# Patient Record
Sex: FEMALE | Race: WHITE | Employment: PART TIME | URBAN - NONMETROPOLITAN AREA
[De-identification: names, ages, dates, MRNs, and addresses within clinical notes are randomized per-mention and may not be internally consistent; named-entity substitution may affect disease eponyms.]

---

## 2017-01-17 ENCOUNTER — TELEPHONE (OUTPATIENT)
Dept: UROLOGY | Age: 37
End: 2017-01-17

## 2017-01-17 RX ORDER — FLUCONAZOLE 150 MG/1
150 TABLET ORAL ONCE
Qty: 1 TABLET | Refills: 0 | Status: SHIPPED | OUTPATIENT
Start: 2017-01-17 | End: 2017-01-17

## 2017-01-24 ENCOUNTER — TELEPHONE (OUTPATIENT)
Dept: UROLOGY | Age: 37
End: 2017-01-24

## 2017-02-23 ENCOUNTER — OFFICE VISIT (OUTPATIENT)
Dept: FAMILY MEDICINE CLINIC | Age: 37
End: 2017-02-23

## 2017-02-23 ENCOUNTER — TELEPHONE (OUTPATIENT)
Dept: FAMILY MEDICINE CLINIC | Age: 37
End: 2017-02-23

## 2017-02-23 VITALS
WEIGHT: 201.6 LBS | SYSTOLIC BLOOD PRESSURE: 132 MMHG | HEART RATE: 88 BPM | BODY MASS INDEX: 30.55 KG/M2 | TEMPERATURE: 98.2 F | RESPIRATION RATE: 18 BRPM | HEIGHT: 68 IN | DIASTOLIC BLOOD PRESSURE: 77 MMHG

## 2017-02-23 DIAGNOSIS — R07.81 RIB PAIN ON LEFT SIDE: Primary | ICD-10-CM

## 2017-02-23 DIAGNOSIS — F17.210 CIGARETTE NICOTINE DEPENDENCE WITHOUT COMPLICATION: Chronic | ICD-10-CM

## 2017-02-23 DIAGNOSIS — I10 ESSENTIAL HYPERTENSION: Chronic | ICD-10-CM

## 2017-02-23 DIAGNOSIS — R22.2 CHEST WALL MASS: ICD-10-CM

## 2017-02-23 PROCEDURE — 99214 OFFICE O/P EST MOD 30 MIN: CPT | Performed by: FAMILY MEDICINE

## 2017-02-23 RX ORDER — VARENICLINE TARTRATE 25 MG
KIT ORAL
Qty: 1 EACH | Refills: 0 | Status: SHIPPED | OUTPATIENT
Start: 2017-02-23 | End: 2017-05-09

## 2017-02-27 ENCOUNTER — TELEPHONE (OUTPATIENT)
Dept: FAMILY MEDICINE CLINIC | Age: 37
End: 2017-02-27

## 2017-03-03 ENCOUNTER — TELEPHONE (OUTPATIENT)
Dept: FAMILY MEDICINE CLINIC | Age: 37
End: 2017-03-03

## 2017-03-07 DIAGNOSIS — I10 ESSENTIAL HYPERTENSION: Chronic | ICD-10-CM

## 2017-03-07 RX ORDER — AMLODIPINE BESYLATE 10 MG/1
10 TABLET ORAL DAILY
Qty: 90 TABLET | Refills: 3 | Status: SHIPPED | OUTPATIENT
Start: 2017-03-07 | End: 2020-01-17 | Stop reason: SDUPTHER

## 2017-03-08 ENCOUNTER — TELEPHONE (OUTPATIENT)
Dept: UROLOGY | Age: 37
End: 2017-03-08

## 2017-03-23 ENCOUNTER — OFFICE VISIT (OUTPATIENT)
Dept: FAMILY MEDICINE CLINIC | Age: 37
End: 2017-03-23

## 2017-03-23 VITALS
HEART RATE: 76 BPM | BODY MASS INDEX: 31.01 KG/M2 | RESPIRATION RATE: 16 BRPM | WEIGHT: 204.6 LBS | TEMPERATURE: 98.2 F | HEIGHT: 68 IN | SYSTOLIC BLOOD PRESSURE: 118 MMHG | DIASTOLIC BLOOD PRESSURE: 76 MMHG

## 2017-03-23 DIAGNOSIS — F17.211 CIGARETTE NICOTINE DEPENDENCE IN REMISSION: Primary | Chronic | ICD-10-CM

## 2017-03-23 PROBLEM — R07.81 RIB PAIN ON LEFT SIDE: Status: RESOLVED | Noted: 2017-02-23 | Resolved: 2017-03-23

## 2017-03-23 PROBLEM — R22.2 CHEST WALL MASS: Status: RESOLVED | Noted: 2017-02-23 | Resolved: 2017-03-23

## 2017-03-23 PROCEDURE — 99213 OFFICE O/P EST LOW 20 MIN: CPT | Performed by: FAMILY MEDICINE

## 2017-03-23 RX ORDER — VARENICLINE TARTRATE 1 MG/1
1 TABLET, FILM COATED ORAL 2 TIMES DAILY
Qty: 60 TABLET | Refills: 1 | Status: SHIPPED | OUTPATIENT
Start: 2017-03-23 | End: 2017-05-09

## 2017-05-09 ENCOUNTER — OFFICE VISIT (OUTPATIENT)
Dept: UROLOGY | Age: 37
End: 2017-05-09

## 2017-05-09 VITALS
DIASTOLIC BLOOD PRESSURE: 90 MMHG | HEIGHT: 67 IN | SYSTOLIC BLOOD PRESSURE: 130 MMHG | WEIGHT: 190 LBS | BODY MASS INDEX: 29.82 KG/M2

## 2017-05-09 DIAGNOSIS — N39.0 RECURRENT UTI: Primary | Chronic | ICD-10-CM

## 2017-05-09 LAB
BILIRUBIN URINE: NEGATIVE
BLOOD URINE, POC: NORMAL
CHARACTER, URINE: CLEAR
COLOR, URINE: YELLOW
GLUCOSE URINE: NEGATIVE MG/DL
KETONES, URINE: NEGATIVE
LEUKOCYTE CLUMPS, URINE: NORMAL
NITRITE, URINE: POSITIVE
PH, URINE: 6.5
POST VOID RESIDUAL (PVR): 89 ML
PROTEIN, URINE: NEGATIVE MG/DL
SPECIFIC GRAVITY, URINE: 1.02 (ref 1–1.03)
UROBILINOGEN, URINE: 0.2 EU/DL

## 2017-05-09 PROCEDURE — 51798 US URINE CAPACITY MEASURE: CPT | Performed by: NURSE PRACTITIONER

## 2017-05-09 PROCEDURE — 81003 URINALYSIS AUTO W/O SCOPE: CPT | Performed by: NURSE PRACTITIONER

## 2017-05-09 PROCEDURE — 99213 OFFICE O/P EST LOW 20 MIN: CPT | Performed by: NURSE PRACTITIONER

## 2017-05-09 RX ORDER — METHENAMINE HIPPURATE 1000 MG/1
1 TABLET ORAL 2 TIMES DAILY WITH MEALS
Qty: 60 TABLET | Refills: 11 | Status: SHIPPED | OUTPATIENT
Start: 2017-05-09 | End: 2018-05-09

## 2017-05-11 ENCOUNTER — TELEPHONE (OUTPATIENT)
Dept: UROLOGY | Age: 37
End: 2017-05-11

## 2017-05-11 LAB
ORGANISM: ABNORMAL
URINE CULTURE, ROUTINE: ABNORMAL

## 2017-06-13 ENCOUNTER — OFFICE VISIT (OUTPATIENT)
Dept: UROLOGY | Age: 37
End: 2017-06-13

## 2017-06-13 VITALS
DIASTOLIC BLOOD PRESSURE: 80 MMHG | HEIGHT: 68 IN | WEIGHT: 205 LBS | SYSTOLIC BLOOD PRESSURE: 118 MMHG | BODY MASS INDEX: 31.07 KG/M2

## 2017-06-13 DIAGNOSIS — N39.0 RECURRENT UTI: Primary | ICD-10-CM

## 2017-06-13 LAB
BILIRUBIN URINE: NEGATIVE
BLOOD URINE, POC: NORMAL
CHARACTER, URINE: CLEAR
COLOR, URINE: YELLOW
GLUCOSE URINE: NEGATIVE MG/DL
KETONES, URINE: NEGATIVE
LEUKOCYTE CLUMPS, URINE: NEGATIVE
NITRITE, URINE: NEGATIVE
PH, URINE: 7
POST VOID RESIDUAL (PVR): 39 ML
PROTEIN, URINE: NEGATIVE MG/DL
SPECIFIC GRAVITY, URINE: 1.02 (ref 1–1.03)
UROBILINOGEN, URINE: 0.2 EU/DL

## 2017-06-13 PROCEDURE — 99999 PR OFFICE/OUTPT VISIT,PROCEDURE ONLY: CPT | Performed by: NURSE PRACTITIONER

## 2017-06-13 PROCEDURE — 99999 POST VOID RESIDUAL (PVR): CPT | Performed by: NURSE PRACTITIONER

## 2017-06-13 PROCEDURE — 81003 URINALYSIS AUTO W/O SCOPE: CPT | Performed by: NURSE PRACTITIONER

## 2018-02-07 ENCOUNTER — HOSPITAL ENCOUNTER (EMERGENCY)
Age: 38
Discharge: HOME | End: 2018-02-07
Payer: COMMERCIAL

## 2018-02-07 VITALS
HEART RATE: 91 BPM | BODY MASS INDEX: 29.58 KG/M2 | DIASTOLIC BLOOD PRESSURE: 111 MMHG | WEIGHT: 195.2 LBS | RESPIRATION RATE: 16 BRPM | SYSTOLIC BLOOD PRESSURE: 159 MMHG | TEMPERATURE: 98.3 F | HEIGHT: 68 IN | OXYGEN SATURATION: 98 %

## 2018-02-07 DIAGNOSIS — J10.1 INFLUENZA A: Primary | ICD-10-CM

## 2018-02-07 LAB
FLU A ANTIGEN: POSITIVE
INFLUENZA B AG, EIA: NEGATIVE

## 2018-02-07 PROCEDURE — 99214 OFFICE O/P EST MOD 30 MIN: CPT

## 2018-02-07 PROCEDURE — 99214 OFFICE O/P EST MOD 30 MIN: CPT | Performed by: NURSE PRACTITIONER

## 2018-02-07 PROCEDURE — 87804 INFLUENZA ASSAY W/OPTIC: CPT

## 2018-02-07 RX ORDER — ACETAMINOPHEN, ASPIRIN AND CAFFEINE 250; 250; 65 MG/1; MG/1; MG/1
1 TABLET, FILM COATED ORAL EVERY 6 HOURS PRN
COMMUNITY
End: 2018-03-31

## 2018-02-07 RX ORDER — OSELTAMIVIR PHOSPHATE 75 MG/1
75 CAPSULE ORAL 2 TIMES DAILY
Qty: 10 CAPSULE | Refills: 0 | Status: SHIPPED | OUTPATIENT
Start: 2018-02-07 | End: 2018-02-12

## 2018-02-07 ASSESSMENT — PAIN DESCRIPTION - DESCRIPTORS: DESCRIPTORS: ACHING

## 2018-02-07 ASSESSMENT — PAIN DESCRIPTION - LOCATION: LOCATION: GENERALIZED

## 2018-02-07 ASSESSMENT — PAIN SCALES - GENERAL: PAINLEVEL_OUTOF10: 5

## 2018-02-07 ASSESSMENT — PAIN DESCRIPTION - PAIN TYPE: TYPE: ACUTE PAIN

## 2018-02-08 ASSESSMENT — ENCOUNTER SYMPTOMS
SORE THROAT: 0
COUGH: 1
VOMITING: 0
VOICE CHANGE: 0
RHINORRHEA: 1
DIARRHEA: 1
ABDOMINAL PAIN: 0
SINUS PRESSURE: 0
STRIDOR: 0
WHEEZING: 0
TROUBLE SWALLOWING: 0
NAUSEA: 0

## 2018-02-09 NOTE — ED PROVIDER NOTES
regular rhythm, S1 normal, S2 normal and normal heart sounds. No murmur heard. Pulmonary/Chest: Effort normal and breath sounds normal. No accessory muscle usage or stridor. No respiratory distress. She has no decreased breath sounds. She has no wheezes. She has no rhonchi. She has no rales. Abdominal: Soft. Normal appearance and bowel sounds are normal. She exhibits no distension. There is no tenderness. There is no rigidity, no rebound and no guarding. Lymphadenopathy:     She has no cervical adenopathy. Neurological: She is alert and oriented to person, place, and time. Skin: Skin is warm, dry and intact. No rash noted. She is not diaphoretic. No cyanosis or erythema. No pallor. Nursing note and vitals reviewed. DIAGNOSTIC RESULTS   Labs:  Results for orders placed or performed during the hospital encounter of 02/07/18   Rapid influenza A/B antigens   Result Value Ref Range    Flu A Antigen POSITIVE (A) NEGATIVE    Influenza B Ag, EIA NEGATIVE NEGATIVE       IMAGING:    URGENT CARE COURSE:     Vitals:    02/07/18 1118   BP: (!) 159/111   Pulse: 91   Resp: 16   Temp: 98.3 °F (36.8 °C)   TempSrc: Oral   SpO2: 98%   Weight: 195 lb 3.2 oz (88.5 kg)   Height: 5' 8\" (1.727 m)       Medications - No data to display  PROCEDURES:  None  FINAL IMPRESSION      1. Influenza A        DISPOSITION/PLAN   DISPOSITION Decision To Discharge 02/07/2018 12:05:12 PM  Influenza A positive   I have recommend the following symptomatic treatment for influenza: push fluids, use a vaporizer, use Tylenol or OTC NSAID's (Advil, Alleve etc) for fever or achiness, OTC cough suppressant/decongestants such as Robitussin DM and rest. The symptoms usually resolve in 4-6 days. Call for appointment if symptoms persist or if develops new symptoms such as wheezing or shortness of breath.    PATIENT REFERRED TO:  Jeane [address]ard [address]  [address] [facility]  [phone]    Schedule an appointment as soon as possible for a

## 2018-03-31 ENCOUNTER — HOSPITAL ENCOUNTER (EMERGENCY)
Age: 38
Discharge: HOME | End: 2018-03-31
Payer: COMMERCIAL

## 2018-03-31 VITALS
BODY MASS INDEX: 30.92 KG/M2 | TEMPERATURE: 98.4 F | OXYGEN SATURATION: 99 % | WEIGHT: 197 LBS | HEIGHT: 67 IN | DIASTOLIC BLOOD PRESSURE: 107 MMHG | RESPIRATION RATE: 18 BRPM | SYSTOLIC BLOOD PRESSURE: 168 MMHG | HEART RATE: 74 BPM

## 2018-03-31 DIAGNOSIS — H10.022 OTHER MUCOPURULENT CONJUNCTIVITIS OF LEFT EYE: Primary | ICD-10-CM

## 2018-03-31 PROCEDURE — 99213 OFFICE O/P EST LOW 20 MIN: CPT

## 2018-03-31 PROCEDURE — 99213 OFFICE O/P EST LOW 20 MIN: CPT | Performed by: NURSE PRACTITIONER

## 2018-03-31 RX ORDER — TOBRAMYCIN AND DEXAMETHASONE 3; 1 MG/ML; MG/ML
1 SUSPENSION/ DROPS OPHTHALMIC
Qty: 1 BOTTLE | Refills: 0 | Status: SHIPPED | OUTPATIENT
Start: 2018-03-31 | End: 2018-04-10

## 2018-03-31 ASSESSMENT — ENCOUNTER SYMPTOMS
WHEEZING: 0
CRUSTING: 1
EYE WATERING: 1
EYE REDNESS: 1
PHOTOPHOBIA: 1
EYE DISCHARGE: 1
EYE INFLAMMATION: 1
SINUS PAIN: 0
CHEST TIGHTNESS: 0
ALLERGIC/IMMUNOLOGIC NEGATIVE: 1
PERI-ORBITAL EDEMA: 1
ABDOMINAL DISTENTION: 0
ABDOMINAL PAIN: 0
COUGH: 0
EYE ITCHING: 1

## 2018-03-31 ASSESSMENT — PAIN DESCRIPTION - LOCATION: LOCATION: EYE

## 2018-03-31 ASSESSMENT — VISUAL ACUITY
OS: 20/25
OD: 20/20
OU: 20/20

## 2018-03-31 ASSESSMENT — PAIN SCALES - GENERAL: PAINLEVEL_OUTOF10: 9

## 2018-03-31 ASSESSMENT — PAIN DESCRIPTION - ORIENTATION: ORIENTATION: LEFT

## 2018-03-31 ASSESSMENT — PAIN DESCRIPTION - DESCRIPTORS: DESCRIPTORS: DISCOMFORT

## 2018-03-31 ASSESSMENT — PAIN DESCRIPTION - PAIN TYPE: TYPE: ACUTE PAIN

## 2020-01-17 ENCOUNTER — HOSPITAL ENCOUNTER (EMERGENCY)
Age: 40
Discharge: HOME | End: 2020-01-17
Payer: COMMERCIAL

## 2020-01-17 VITALS
OXYGEN SATURATION: 100 % | WEIGHT: 183 LBS | HEART RATE: 68 BPM | SYSTOLIC BLOOD PRESSURE: 184 MMHG | BODY MASS INDEX: 28.72 KG/M2 | TEMPERATURE: 97.8 F | RESPIRATION RATE: 16 BRPM | DIASTOLIC BLOOD PRESSURE: 102 MMHG | HEIGHT: 67 IN

## 2020-01-17 PROCEDURE — 99213 OFFICE O/P EST LOW 20 MIN: CPT | Performed by: NURSE PRACTITIONER

## 2020-01-17 PROCEDURE — 99213 OFFICE O/P EST LOW 20 MIN: CPT

## 2020-01-17 RX ORDER — BLOOD PRESSURE TEST KIT
1 KIT MISCELLANEOUS DAILY
Qty: 1 KIT | Refills: 0 | Status: SHIPPED | OUTPATIENT
Start: 2020-01-17 | End: 2022-09-22

## 2020-01-17 RX ORDER — AMLODIPINE BESYLATE 10 MG/1
10 TABLET ORAL DAILY
Qty: 7 TABLET | Refills: 0 | Status: SHIPPED | OUTPATIENT
Start: 2020-01-17 | End: 2020-04-09 | Stop reason: SDUPTHER

## 2020-01-17 ASSESSMENT — ENCOUNTER SYMPTOMS
EYE PAIN: 0
SHORTNESS OF BREATH: 0
VOMITING: 1
BACK PAIN: 0
CHEST TIGHTNESS: 0
NAUSEA: 0
ALLERGIC/IMMUNOLOGIC NEGATIVE: 1
ABDOMINAL PAIN: 0
COUGH: 0

## 2020-01-17 NOTE — LETTER
[facility]  [address]  [address]  Phone: [phone]               January 17, 2020    Patient: Tesfaye Mahmood   YOB: 1980   Date of Visit: 1/17/2020       To Whom It May Concern:    Natalie Jiang was seen and treated in our emergency department on 1/17/2020. She may return to work on 1-.       Sincerely,       PETER Marina CNP         Signature:____Electronically signed by PETER Marina CNP on 1/17/2020 at 6:14 PM______________________________

## 2020-01-17 NOTE — ED PROVIDER NOTES
disease)     History of blood transfusion 1980    Insomnia 2014    Kidney stones     Migraine headache     Nephrolithiasis     Nicotine dependence     Overweight     Recurrent UTI        SURGICAL HISTORY     Patient  has a past surgical history that includes  section (,,); Lithotripsy (off and on since ); Tubal ligation (); other surgical history (10/21/2016); and Kidney removal (Left). CURRENT MEDICATIONS       Previous Medications    ACETAMINOPHEN (TYLENOL) 325 MG TABLET    Take 650 mg by mouth every 6 hours as needed for Pain. ALLERGIES     Patient is has No Known Allergies. FAMILY HISTORY     Patient'sfamily history includes Diabetes in her maternal grandmother; High Blood Pressure in her mother. SOCIAL HISTORY     Patient  reports that she has been smoking cigarettes. She has a 5.00 pack-year smoking history. She has never used smokeless tobacco. She reports that she does not drink alcohol or use drugs. PHYSICAL EXAM     ED TRIAGE VITALS  BP: (!) 199/102, Temp: 97.8 °F (36.6 °C), Pulse: 68, Resp: 16, SpO2: 100 %  Physical Exam  Vitals signs and nursing note reviewed. Constitutional:       General: She is not in acute distress. Appearance: Normal appearance. She is well-developed, well-groomed and overweight. She is not ill-appearing, toxic-appearing or diaphoretic. HENT:      Head: Normocephalic and atraumatic. Right Ear: Hearing and external ear normal.      Left Ear: Hearing and external ear normal.      Nose: Nose normal.      Mouth/Throat:      Lips: Pink. Mouth: Mucous membranes are moist.   Eyes:      General: Lids are normal.      Extraocular Movements: Extraocular movements intact. Conjunctiva/sclera: Conjunctivae normal.      Right eye: Right conjunctiva is not injected. Left eye: Left conjunctiva is not injected. Pupils: Pupils are equal, round, and reactive to light.  Pupils are equal.      Right eye: Pupil is round, reactive and not sluggish. Left eye: Pupil is round, reactive and not sluggish. Neck:      Musculoskeletal: Normal range of motion. Cardiovascular:      Rate and Rhythm: Normal rate and regular rhythm. Chest Wall: PMI is not displaced. No thrill. Pulses: Normal pulses. Heart sounds: Normal heart sounds. Heart sounds not distant. No murmur. Pulmonary:      Effort: Pulmonary effort is normal. No respiratory distress. Breath sounds: Normal breath sounds and air entry. No stridor, decreased air movement or transmitted upper airway sounds. No decreased breath sounds, wheezing, rhonchi or rales. Abdominal:      General: Abdomen is flat. There is no distension. Tenderness: There is no tenderness. Musculoskeletal:      Right knee: She exhibits normal range of motion. Left knee: She exhibits normal range of motion. Skin:     General: Skin is warm and dry. Coloration: Skin is not pale. Findings: No rash. Comments: No obvious signs of infection or trauma. Neurological:      General: No focal deficit present. Mental Status: She is alert and oriented to person, place, and time. Sensory: No sensory deficit. Psychiatric:         Behavior: Behavior normal. Behavior is cooperative. DIAGNOSTIC RESULTS   Labs:No results found for this visit on 01/17/20. IMAGING:  No orders to display     URGENT CARE COURSE:     Vitals:    01/17/20 1753   BP: (!) 199/102   Pulse: 68   Resp: 16   Temp: 97.8 °F (36.6 °C)   TempSrc: Temporal   SpO2: 100%   Weight: 183 lb (83 kg)   Height: 5' 7\" (1.702 m)       Medications - No data to display  PROCEDURES:  FINALIMPRESSION      1. Nicotine dependence, uncomplicated, unspecified nicotine product type    2. Essential hypertension    3. Atrophic kidney, acquired    4.  Patient overweight        DISPOSITION/PLAN   DISPOSITION Discharge - Pending Orders Complete 01/17/2020 06:08:13 PM  Record blood pressure daily and

## 2020-02-15 ENCOUNTER — HOSPITAL ENCOUNTER (EMERGENCY)
Age: 40
Discharge: HOME | End: 2020-02-15
Payer: COMMERCIAL

## 2020-02-15 VITALS
HEIGHT: 67 IN | TEMPERATURE: 98.8 F | WEIGHT: 180 LBS | HEART RATE: 72 BPM | RESPIRATION RATE: 16 BRPM | SYSTOLIC BLOOD PRESSURE: 147 MMHG | BODY MASS INDEX: 28.25 KG/M2 | OXYGEN SATURATION: 97 % | DIASTOLIC BLOOD PRESSURE: 72 MMHG

## 2020-02-15 PROCEDURE — 99213 OFFICE O/P EST LOW 20 MIN: CPT

## 2020-02-15 PROCEDURE — 99213 OFFICE O/P EST LOW 20 MIN: CPT | Performed by: NURSE PRACTITIONER

## 2020-02-15 RX ORDER — ONDANSETRON 4 MG/1
4 TABLET, FILM COATED ORAL EVERY 8 HOURS PRN
Qty: 15 TABLET | Refills: 0 | Status: SHIPPED | OUTPATIENT
Start: 2020-02-15 | End: 2020-02-20

## 2020-02-15 ASSESSMENT — ENCOUNTER SYMPTOMS
EYE REDNESS: 1
ALLERGIC/IMMUNOLOGIC NEGATIVE: 1
SINUS PRESSURE: 1
VOMITING: 1
NAUSEA: 1
RESPIRATORY NEGATIVE: 1

## 2020-02-15 NOTE — ED TRIAGE NOTES
Ambulates to room in stable condition with c/o vomiting x3 and eye swelling and watery eyes since this am at 0600

## 2020-02-15 NOTE — ED PROVIDER NOTES
[address] [facility] Encounter      CHIEFCOMPLAINT       Chief Complaint   Patient presents with    Emesis    Eye Drainage    Letter for School/Work       Nurses Notes reviewed and I agree except as noted in the HPI. HISTORY OF PRESENT ILLNESS   Amalia Dwyer is a 36 y.o. female who presents with vomiting and ear/eye congestion x 6 hours. vomited 3 x at work. Was sent home for note. No fever or chills. No diarrhea. Does have headache at this time. Has not tried to eat or drink anything since vomiting. REVIEW OF SYSTEMS     Review of Systems   Constitutional: Positive for activity change, appetite change and fatigue. Negative for fever. HENT: Positive for congestion, ear pain and sinus pressure. Eyes: Positive for redness. Respiratory: Negative. Cardiovascular: Negative for chest pain and leg swelling. Gastrointestinal: Positive for nausea and vomiting. Endocrine: Negative for cold intolerance and heat intolerance. Genitourinary: Negative. Musculoskeletal: Positive for myalgias. Negative for arthralgias. Skin: Negative. Allergic/Immunologic: Negative. Neurological: Negative for dizziness, weakness and headaches. Hematological: Negative for adenopathy. Does not bruise/bleed easily. Psychiatric/Behavioral: Negative. PAST MEDICAL HISTORY         Diagnosis Date    Atrophic kidney     Left    Essential hypertension 2016    GERD (gastroesophageal reflux disease)     History of blood transfusion     Insomnia 2014    Kidney stones     Migraine headache     Nephrolithiasis     Nicotine dependence     Overweight     Recurrent UTI        SURGICAL HISTORY     Patient  has a past surgical history that includes  section (,,); Lithotripsy (off and on since ); Tubal ligation (); other surgical history (10/21/2016); and Kidney removal (Left).     CURRENT MEDICATIONS       Previous Medications    ACETAMINOPHEN tenderness. Musculoskeletal: Normal range of motion. General: No tenderness. Lymphadenopathy:      Cervical: No cervical adenopathy. Skin:     General: Skin is warm and dry. Capillary Refill: Capillary refill takes less than 2 seconds. Findings: No erythema or rash. Neurological:      General: No focal deficit present. Mental Status: She is alert and oriented to person, place, and time. Psychiatric:         Behavior: Behavior normal.         Thought Content: Thought content normal.         Judgment: Judgment normal.         DIAGNOSTIC RESULTS   Labs: No results found for this visit on 02/15/20. IMAGING:  No orders to display     URGENT CARE COURSE:     Vitals:    02/15/20 1812   BP: (!) 147/72   Pulse: 72   Resp: 16   Temp: 98.8 °F (37.1 °C)   TempSrc: Oral   SpO2: 97%   Weight: 180 lb (81.6 kg)   Height: 5' 7\" (1.702 m)       Medications - No data to display  PROCEDURES:  None  FINALIMPRESSION    I have reviewed the patient's medical history in detail and updated the computerized patient record. Information given on rehydration and measures for comfort. Work note provided.    1. Non-intractable vomiting with nausea, unspecified vomiting type        DISPOSITION/PLAN   DISPOSITION      PATIENT REFERRED TO:  Jesenia Ovalle. [address]  [phone]    In 3 days  As needed, If symptoms worsen    DISCHARGE MEDICATIONS:  New Prescriptions    ONDANSETRON (ZOFRAN) 4 MG TABLET    Take 1 tablet by mouth every 8 hours as needed for Nausea or Vomiting     Current Discharge Medication List          PETER Woodard CNP, APRN - CNP  02/15/20 4519

## 2020-02-26 ENCOUNTER — TELEPHONE (OUTPATIENT)
Dept: FAMILY MEDICINE CLINIC | Age: 40
End: 2020-02-26

## 2020-02-26 NOTE — TELEPHONE ENCOUNTER
appt reminder  Future Appointments   Date Time Provider Rex Dior   2/27/2020  8:20 AM Ryan Jasso, [facility]

## 2020-04-09 RX ORDER — AMLODIPINE BESYLATE 10 MG/1
10 TABLET ORAL DAILY
Qty: 90 TABLET | Refills: 3 | Status: SHIPPED | OUTPATIENT
Start: 2020-04-09 | End: 2022-04-11

## 2020-10-19 ENCOUNTER — OFFICE VISIT (OUTPATIENT)
Dept: FAMILY MEDICINE CLINIC | Age: 40
End: 2020-10-19
Payer: COMMERCIAL

## 2020-10-19 VITALS
SYSTOLIC BLOOD PRESSURE: 122 MMHG | OXYGEN SATURATION: 99 % | RESPIRATION RATE: 14 BRPM | DIASTOLIC BLOOD PRESSURE: 80 MMHG | WEIGHT: 159 LBS | BODY MASS INDEX: 24.96 KG/M2 | HEIGHT: 67 IN | HEART RATE: 87 BPM | TEMPERATURE: 98.4 F

## 2020-10-19 PROCEDURE — 99204 OFFICE O/P NEW MOD 45 MIN: CPT | Performed by: FAMILY MEDICINE

## 2020-10-19 RX ORDER — VARENICLINE TARTRATE 1 MG/1
1 TABLET, FILM COATED ORAL 2 TIMES DAILY
Qty: 60 TABLET | Refills: 3 | Status: SHIPPED | OUTPATIENT
Start: 2020-10-19 | End: 2021-06-17

## 2020-10-19 RX ORDER — VARENICLINE TARTRATE
KIT
Qty: 1 BOX | Refills: 0 | OUTPATIENT
Start: 2020-10-19 | End: 2021-06-17

## 2020-10-19 RX ORDER — PREDNISONE 20 MG/1
40 TABLET ORAL DAILY
Qty: 10 TABLET | Refills: 0 | Status: SHIPPED | OUTPATIENT
Start: 2020-10-19 | End: 2020-10-24

## 2020-10-19 ASSESSMENT — PATIENT HEALTH QUESTIONNAIRE - PHQ9
SUM OF ALL RESPONSES TO PHQ QUESTIONS 1-9: 0
1. LITTLE INTEREST OR PLEASURE IN DOING THINGS: 0
SUM OF ALL RESPONSES TO PHQ QUESTIONS 1-9: 0
SUM OF ALL RESPONSES TO PHQ QUESTIONS 1-9: 0
2. FEELING DOWN, DEPRESSED OR HOPELESS: 0
SUM OF ALL RESPONSES TO PHQ9 QUESTIONS 1 & 2: 0

## 2020-10-19 NOTE — PROGRESS NOTES
Chief Complaint   Patient presents with    Neck Pain     radiation down L arm    Hypertension    Nicotine Dependence       History obtained from the patient. SUBJECTIVE:  Jac Segura is a 36 y.o. female that presents today for establishing care with new physician, etc. New patient, 1st time visit to [facility]. Last seen 21 MARCH 2017, >3 years ago, by definition is a new pt.       -Neck and L shoulder Pain:    HPI:   Neck pain going on a few months on and off  Started having pain radiating down L arm since Sunday  No obvious cause  Does work at a [employer]  Woke up with the above sxs  Better with motrin and biofeeze  Worse moving neck or laying on L side. No prior neck injuries    She describes the pain as dull, aching, throbbing  in the cervical region. Inciting injury or history of trauma? No  Pain is relieved by - as above  Pain is aggravated by - as above  Radiation of the pain? Yes - L arm  Paresthesias of the extremities? No  Saddle anesthesia? No  Bowel or bladder incontinence? No  Treatments tried - motrin, biofreeze.          -HTN:    HPI:    Taking meds as prescribed ?: yes  Tolerating well ?: yes  Side Effects ?: denies  BP at home ?: <140/90  Working on TLCS ?: yes  Chest Pain/SOB/Palpitations? denies    BP Readings from Last 3 Encounters:   10/19/20 122/80   02/15/20 (!) 147/72   01/17/20 (!) 184/102         -Smoking:  Smoking 1/2 PPD  Wants to quit  Failed patches and gum        Age/Gender Health Maintenance    Lipid -   Lab Results   Component Value Date    CHOL 132 02/24/2017    CHOL 141 02/12/2016    CHOL 148 05/28/2013     Lab Results   Component Value Date    TRIG 97 02/24/2017    TRIG 114 02/12/2016    TRIG 132 05/28/2013     Lab Results   Component Value Date    HDL 33 02/24/2017    HDL 34 02/12/2016    HDL 37 05/28/2013     Lab Results   Component Value Date    LDLCALC 80 02/24/2017    LDLCALC 84 02/12/2016    LDLCALC 109 05/28/2013     Lab Results   Component Value Date VLDL 26 05/28/2013     No results found for: CHOLHDLRATIO      DM Screen -   Lab Results   Component Value Date    GLUCOSE 89 02/24/2017         Colon Cancer Screening - age 48    Tetanus - UTD 2010 and UTD TD DEC 2019  Influenza Vaccine - declines OCT 2020  Pneumonia Vaccine - discuss next visit if hasn't quit smoking. Zoster - age 48    Breast Cancer Screening - Age 36  Cervical Cancer Screening - NEG DEC 2012  Osteoporosis Screening - Age 72    Falls screening - N/A        Current Outpatient Medications   Medication Sig Dispense Refill    varenicline (CHANTIX CONTINUING MONTH RAFITA) 1 MG tablet Take 1 tablet by mouth 2 times daily 60 tablet 3    varenicline (CHANTIX STARTING MONTH RAFITA) 0.5 MG X 11 & 1 MG X 42 tablet Take by mouth. 1 box 0    predniSONE (DELTASONE) 20 MG tablet Take 2 tablets by mouth daily for 5 days 10 tablet 0    amLODIPine (NORVASC) 10 MG tablet Take 1 tablet by mouth daily 90 tablet 3    Blood Pressure KIT 1 kit by Does not apply route daily 1 kit 0    acetaminophen (TYLENOL) 325 MG tablet Take 650 mg by mouth every 6 hours as needed for Pain. No current facility-administered medications for this visit. Orders Placed This Encounter   Medications    varenicline (CHANTIX CONTINUING MONTH RAFITA) 1 MG tablet     Sig: Take 1 tablet by mouth 2 times daily     Dispense:  60 tablet     Refill:  3    varenicline (CHANTIX STARTING MONTH RAFITA) 0.5 MG X 11 & 1 MG X 42 tablet     Sig: Take by mouth. Dispense:  1 box     Refill:  0    predniSONE (DELTASONE) 20 MG tablet     Sig: Take 2 tablets by mouth daily for 5 days     Dispense:  10 tablet     Refill:  0         All medications reviewed and reconciled, including OTC and herbal medications. Updated list given to patient.        Patient Active Problem List    Diagnosis Date Noted    XGP (xanthogranulomatous pyelonephritis)     Hematuria     Obstruction of left ureteropelvic junction (UPJ) due to stone     Left ureteral stone 09/15/2016    Essential hypertension 2016    Seborrheic dermatitis     Biliary colic     Insomnia     Atrophic kidney, acquired      Left      Nicotine dependence     Migraine headache     Recurrent UTI     GERD (gastroesophageal reflux disease)     Nephrolithiasis          Past Medical History:   Diagnosis Date    Atrophic kidney, acquired     Left     Essential hypertension 2016    GERD (gastroesophageal reflux disease)     History of blood transfusion 1980    Insomnia 2014    Migraine headache     Nephrolithiasis     Nicotine dependence     Recurrent UTI          Past Surgical History:   Procedure Laterality Date     SECTION  ,,    KIDNEY REMOVAL Left     complete    LITHOTRIPSY  off and on since     OTHER SURGICAL HISTORY  10/21/2016    Robotic Left Nephroureterectomy - Dr. Jana Mei.  TUBAL LIGATION           No Known Allergies      Social History     Tobacco Use    Smoking status: Current Every Day Smoker     Packs/day: 0.50     Years: 10.00     Pack years: 5.00     Types: Cigarettes    Smokeless tobacco: Never Used    Tobacco comment: information given at past appts for smoking cessation   Substance Use Topics    Alcohol use: No         Family History   Problem Relation Age of Onset    High Blood Pressure Mother     Diabetes Maternal Grandmother          I have reviewed the patient's past medical history, past surgical history, allergies, medications, social and family history and I have made updates where appropriate.       Review of Systems  Positive responses are highlighted in bold    Constitutional:  Fever, Chills, Night Sweats, Fatigue, Unexpected changes in weight  HENT:  Ear pain, Tinnitus, Nosebleeds, Trouble swallowing, Hearing loss, Sore throat  Cardiovascular:  Chest Pain, Palpitations, Orthopnea, Paroxysmal Nocturnal Dyspnea  Respiratory:  Cough, Wheezing, Shortness of breath, Chest tightness, Apnea  Gastrointestinal:  Nausea, Vomiting, Diarrhea, Constipation, Heartburn, Blood in stool  Genitourinary:  Difficulty or painful urination, Flank pain, Change in frequency, Urgency  Skin:  Color change, Rash, Itching, Wound  Musculoskeletal:  Joint pain, Back pain, Gait problems, Joint swelling, Myalgias  Neurological:  Dizziness, Headaches, Presyncope, Numbness, Seizures, Tremors  Endocrine:  Heat Intolerance, Cold Intolerance, Polydipsia, Polyphagia, Polyuria      PHYSICAL EXAM:  Vitals:    10/19/20 1339   BP: 122/80   Pulse: 87   Resp: 14   Temp: 98.4 °F (36.9 °C)   TempSrc: Oral   SpO2: 99%   Weight: 159 lb (72.1 kg)   Height: 5' 7\" (1.702 m)     Body mass index is 24.9 kg/m². Pain Score:   4(neck)    VS Reviewed  General Appearance: A&O x 3, No acute distress,well developed and well- nourished  Eyes: pupils equal, round, and reactive to light, extraocular eye movements intact, conjunctivae and eye lids without erythema  ENT: external ear and ear canal clear bilaterally, TMs intact and regular, nose without deformity, nasal mucosa and turbinates normal without polyps, oropharynx normal, dentition is normal for age  Neck: supple and non-tender without mass, no thyromegaly or thyroid nodules, no cervical lymphadenopathy  Pulmonary/Chest: clear to auscultation bilaterally- no wheezes, rales or rhonchi, normal air movement, no respiratory distress or retractions  Cardiovascular: S1 and S2 auscultated w/ RRR. No murmurs, rubs, clicks, or gallops, distal pulses intact. Abdomen: soft, non-tender, non-distended, bowel sounds physiologic,  no rebound or guarding, no masses or hernias noted. Liver and spleen without enlargement. Extremities: no cyanosis, clubbing or edema of the lower extremities. Skin: warm and dry, no rash or erythema  Cervical Spine Exam:  There is not deformity. There is  loss of motion. There is  muscular spasm. There is not trapezius/ rhomboid tenderness.   There is not spinous process tenderness. There is not cervical lymphadenopathy. Spurling Test is Negative. NEUROLOGICAL EXAM: Examination of the upper and lower extremities are intact with sensation to light touch, motor testing 4+ to 5/5 in all major motor groups including hand intrinsics. Normal heel to toe gait, Negative Romberg, Negative babinski's Sign, Murphy's Sign absent. SLR negative. UE strength    Right Left   Shoulder Abductors 5/5 5/5   Shoulder Adductors 5/5 5/5   Elbow Flexors 5/5 5/5   Elbow Extensors 5/5 5/5   Wrist Flexors 5/5 5/5   Wrist Extensors 5/5 5/5   Pollicis Longus 5/5 5/5   Hand  5/5 5/5     Sensation:  C4 100% 100%   C5 100% 100%   C6 100% 100%   C7 100% 100%   C8 100% 100%   T1 100% 100%   Radial Nerve 100% 100%   Median Nerve 100% 100%   Ulnar Nerve 100% 100%       ASSESSMENT & PLAN  1. Cervicalgia    Hx and exam c/w cervical radiculopathy  No alarm features  Declines PT    Plan:  HEP   Heat   Pred burst  F/u if no better    - predniSONE (DELTASONE) 20 MG tablet; Take 2 tablets by mouth daily for 5 days  Dispense: 10 tablet; Refill: 0    2. Cervical radiculopathy    - predniSONE (DELTASONE) 20 MG tablet; Take 2 tablets by mouth daily for 5 days  Dispense: 10 tablet; Refill: 0    3. Essential hypertension    At goal  con't meds  Due for labs    - CBC Auto Differential; Future  - Comprehensive Metabolic Panel; Future  - Lipid Panel; Future  - Microalbumin / Creatinine Urine Ratio; Future  - TSH with Reflex; Future    4. Cigarette nicotine dependence without complication    Start chantix  F/u if unable to quit    - varenicline (CHANTIX CONTINUING MONTH PAK) 1 MG tablet; Take 1 tablet by mouth 2 times daily  Dispense: 60 tablet; Refill: 3  - varenicline (CHANTIX STARTING MONTH PAK) 0.5 MG X 11 & 1 MG X 42 tablet; Take by mouth. Dispense: 1 box; Refill: 0      DISPOSITION    Return in about 1 year (around 10/19/2021) for follow-up on chronic medical conditions, sooner as needed.     Mook Reich released without restrictions. PATIENT COUNSELING    Media Pop received counseling on the following healthy behaviors: nutrition, exercise, medication adherence and tobacco cessation    Patient given educational materials on: See Attached    I have instructed Media Pop to complete a self tracking handout on Blood Pressures  and Smoking and instructed them to bring it with them to her next appointment. Barriers to learning and self management: none    Discussed use, benefit, and side effects of prescribed medications. Barriers to medication compliance addressed. All patient questions answered. Pt voiced understanding.        Electronically signed by Sari Gavin DO on 10/19/2020 at 2:07 PM

## 2020-10-19 NOTE — PATIENT INSTRUCTIONS
Patient Education        Neck: Exercises  Introduction  Here are some examples of exercises for you to try. The exercises may be suggested for a condition or for rehabilitation. Start each exercise slowly. Ease off the exercises if you start to have pain. You will be told when to start these exercises and which ones will work best for you. How to do the exercises  Neck stretch   1. This stretch works best if you keep your shoulder down as you lean away from it. To help you remember to do this, start by relaxing your shoulders and lightly holding on to your thighs or your chair. 2. Tilt your head toward your shoulder and hold for 15 to 30 seconds. Let the weight of your head stretch your muscles. 3. If you would like a little added stretch, use your hand to gently and steadily pull your head toward your shoulder. For example, keeping your right shoulder down, lean your head to the left. 4. Repeat 2 to 4 times toward each shoulder. Diagonal neck stretch   1. Turn your head slightly toward the direction you will be stretching, and tilt your head diagonally toward your chest and hold for 15 to 30 seconds. 2. If you would like a little added stretch, use your hand to gently and steadily pull your head forward on the diagonal.  3. Repeat 2 to 4 times toward each side. Dorsal glide stretch   The dorsal glide stretches the back of the neck. If you feel pain, do not glide so far back. Some people find this exercise easier to do while lying on their backs with an ice pack on the neck. 1. Sit or stand tall and look straight ahead. 2. Slowly tuck your chin as you glide your head backward over your body  3. Hold for a count of 6, and then relax for up to 10 seconds. 4. Repeat 8 to 12 times. Chest and shoulder stretch   1. Sit or stand tall and glide your head backward as in the dorsal glide stretch. 2. Raise both arms so that your hands are next to your ears.   3. Take a deep breath, and as you breathe out,

## 2020-10-20 ENCOUNTER — TELEPHONE (OUTPATIENT)
Dept: FAMILY MEDICINE CLINIC | Age: 40
End: 2020-10-20

## 2020-10-20 NOTE — TELEPHONE ENCOUNTER
Saw pt yesterday  I forgot to ask her about mammogram  We start discussing those at age 36, so she would be due  I would recommend we get her setup for mammogram  If ok with it, I'll order, let me know, thanks!

## 2020-10-22 NOTE — TELEPHONE ENCOUNTER
LMOM requesting pt to call back at earliest convenience.        Sent letter since this is the 3rd attempt

## 2021-06-17 ENCOUNTER — HOSPITAL ENCOUNTER (EMERGENCY)
Age: 41
Discharge: HOME | End: 2021-06-17
Payer: COMMERCIAL

## 2021-06-17 VITALS
WEIGHT: 157.8 LBS | BODY MASS INDEX: 24.77 KG/M2 | SYSTOLIC BLOOD PRESSURE: 137 MMHG | TEMPERATURE: 97.5 F | OXYGEN SATURATION: 97 % | HEART RATE: 78 BPM | HEIGHT: 67 IN | RESPIRATION RATE: 16 BRPM | DIASTOLIC BLOOD PRESSURE: 100 MMHG

## 2021-06-17 DIAGNOSIS — J01.90 ACUTE VIRAL SINUSITIS: Primary | ICD-10-CM

## 2021-06-17 DIAGNOSIS — B97.89 ACUTE VIRAL SINUSITIS: Primary | ICD-10-CM

## 2021-06-17 PROCEDURE — 99213 OFFICE O/P EST LOW 20 MIN: CPT

## 2021-06-17 PROCEDURE — U0005 INFEC AGEN DETEC AMPLI PROBE: HCPCS

## 2021-06-17 PROCEDURE — 99213 OFFICE O/P EST LOW 20 MIN: CPT | Performed by: NURSE PRACTITIONER

## 2021-06-17 PROCEDURE — U0003 INFECTIOUS AGENT DETECTION BY NUCLEIC ACID (DNA OR RNA); SEVERE ACUTE RESPIRATORY SYNDROME CORONAVIRUS 2 (SARS-COV-2) (CORONAVIRUS DISEASE [COVID-19]), AMPLIFIED PROBE TECHNIQUE, MAKING USE OF HIGH THROUGHPUT TECHNOLOGIES AS DESCRIBED BY CMS-2020-01-R: HCPCS

## 2021-06-17 RX ORDER — AZELASTINE 1 MG/ML
1 SPRAY, METERED NASAL 2 TIMES DAILY
Qty: 1 BOTTLE | Refills: 0 | Status: SHIPPED | OUTPATIENT
Start: 2021-06-17 | End: 2022-09-22

## 2021-06-17 RX ORDER — CETIRIZINE HYDROCHLORIDE 10 MG/1
10 TABLET ORAL DAILY
COMMUNITY
End: 2021-09-01 | Stop reason: SDUPTHER

## 2021-06-17 RX ORDER — DEXTROMETHORPHAN HYDROBROMIDE AND PROMETHAZINE HYDROCHLORIDE 15; 6.25 MG/5ML; MG/5ML
5 SYRUP ORAL 4 TIMES DAILY PRN
Qty: 100 ML | Refills: 0 | Status: SHIPPED | OUTPATIENT
Start: 2021-06-17 | End: 2021-06-22

## 2021-06-17 ASSESSMENT — ENCOUNTER SYMPTOMS
SORE THROAT: 1
STRIDOR: 0
SWOLLEN GLANDS: 0
APNEA: 0
SHORTNESS OF BREATH: 0
CHEST TIGHTNESS: 0
SINUS PAIN: 0
WHEEZING: 0
COUGH: 1
RHINORRHEA: 1
SINUS PRESSURE: 1
CHOKING: 0

## 2021-06-17 ASSESSMENT — PAIN DESCRIPTION - LOCATION: LOCATION: HEAD

## 2021-06-17 ASSESSMENT — PAIN DESCRIPTION - DESCRIPTORS: DESCRIPTORS: PRESSURE

## 2021-06-17 ASSESSMENT — PAIN SCALES - GENERAL: PAINLEVEL_OUTOF10: 9

## 2021-06-17 ASSESSMENT — PAIN DESCRIPTION - FREQUENCY: FREQUENCY: CONTINUOUS

## 2021-06-17 ASSESSMENT — PAIN DESCRIPTION - PAIN TYPE: TYPE: ACUTE PAIN

## 2021-06-17 NOTE — ED PROVIDER NOTES
Left     Essential hypertension 2016    GERD (gastroesophageal reflux disease)     History of blood transfusion     Insomnia 2014    Migraine headache     Nephrolithiasis     Nicotine dependence     Recurrent UTI        SURGICAL HISTORY     Patient  has a past surgical history that includes  section (,,); Lithotripsy (off and on since ); Tubal ligation (); other surgical history (10/21/2016); and Kidney removal (Left). CURRENT MEDICATIONS       Discharge Medication List as of 2021 12:22 PM      CONTINUE these medications which have NOT CHANGED    Details   cetirizine (ZYRTEC) 10 MG tablet Take 10 mg by mouth dailyHistorical Med      amLODIPine (NORVASC) 10 MG tablet Take 1 tablet by mouth daily, Disp-90 tablet,R-3Normal      acetaminophen (TYLENOL) 325 MG tablet Take 1,000 mg by mouth every 6 hours as needed for Pain Historical Med      Blood Pressure KIT DAILY Starting 2020, Disp-1 kit, R-0, Normal             ALLERGIES     Patient is has No Known Allergies. FAMILY HISTORY     Patient's family history includes Diabetes in her maternal grandmother; High Blood Pressure in her mother. SOCIAL HISTORY     Patient  reports that she has been smoking cigarettes. She has a 5.00 pack-year smoking history. She has never used smokeless tobacco. She reports that she does not drink alcohol and does not use drugs. PHYSICAL EXAM     ED TRIAGE VITALS  BP: (!) 137/100 (pt states she did not take her BP meds today and smoked hour ago), Temp: 97.5 °F (36.4 °C), Pulse: 78, Resp: 16, SpO2: 97 %  Physical Exam  Vitals and nursing note reviewed. Constitutional:       General: She is awake. She is not in acute distress. Appearance: Normal appearance. She is well-developed, well-groomed and normal weight. She is not ill-appearing, toxic-appearing or diaphoretic. HENT:      Head: Normocephalic and atraumatic.       Right Ear: Hearing, tympanic membrane, ear canal and external ear normal.      Left Ear: Hearing, tympanic membrane, ear canal and external ear normal.      Nose: Congestion and rhinorrhea present. Right Nostril: Occlusion present. Left Nostril: Occlusion present. Right Sinus: Frontal sinus tenderness present. Left Sinus: Frontal sinus tenderness present. Mouth/Throat:      Mouth: Mucous membranes are moist.      Pharynx: No oropharyngeal exudate or posterior oropharyngeal erythema. Eyes:      Extraocular Movements: Extraocular movements intact. Conjunctiva/sclera: Conjunctivae normal.   Pulmonary:      Effort: Pulmonary effort is normal. No tachypnea, bradypnea, accessory muscle usage, prolonged expiration or respiratory distress. Breath sounds: Normal breath sounds. No stridor, decreased air movement or transmitted upper airway sounds. No decreased breath sounds, wheezing, rhonchi or rales. Chest:      Chest wall: No tenderness. Musculoskeletal:         General: Normal range of motion. Cervical back: Normal range of motion. Skin:     General: Skin is warm. Neurological:      General: No focal deficit present. Mental Status: She is alert and oriented to person, place, and time. Psychiatric:         Mood and Affect: Mood normal.         Behavior: Behavior normal. Behavior is cooperative. Thought Content: Thought content normal.         Judgment: Judgment normal.         DIAGNOSTIC RESULTS   Labs:No results found for this visit on 06/17/21. IMAGING:  No orders to display     URGENT CARE COURSE:     Vitals:    06/17/21 1208   BP: (!) 137/100   Pulse: 78   Resp: 16   Temp: 97.5 °F (36.4 °C)   TempSrc: Temporal   SpO2: 97%   Weight: 157 lb 12.8 oz (71.6 kg)   Height: 5' 7\" (1.702 m)       Medications - No data to display  PROCEDURES:  None  FINAL IMPRESSION      1.  Acute viral sinusitis        DISPOSITION/PLAN   Decision To Discharge    Drink lots of fluids  Take Motrin or Tylenol for headache  Humidification of air  Use nasal spray as directed  Take a nasal decongestant as directed  Monitor for any fever increased and sinus pain or pressure  Follow-up see her primary care provider in 48 hours  Or go to the emergency department for any changes or concerns.       PATIENT REFERRED TO:  Teresa Dyer  [phone]      As needed    DISCHARGE MEDICATIONS:  Discharge Medication List as of 6/17/2021 12:22 PM      START taking these medications    Details   promethazine-dextromethorphan (PROMETHAZINE-DM) 6.25-15 MG/5ML syrup Take 5 mLs by mouth 4 times daily as needed for Cough (May cause drowsiness), Disp-100 mL, R-0Normal      azelastine (ASTELIN) 0.1 % nasal spray 1 spray by Nasal route 2 times daily Use in each nostril as directed, Disp-1 Bottle, R-0Normal           Discharge Medication List as of 6/17/2021 12:22 PM          Ave Marker, APRN - CNP          Ave Marker, APRN - CNP  06/17/21 0189

## 2021-06-17 NOTE — ED TRIAGE NOTES
Pt ambulatory into esuc with c/o sinus drainage, congestion, headache and cough for the past four days. 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 Pt states at times she thinks she has loss of taste. Pt states headache pain 9.

## 2021-06-18 ENCOUNTER — CARE COORDINATION (OUTPATIENT)
Dept: CARE COORDINATION | Age: 41
End: 2021-06-18

## 2021-06-18 NOTE — CARE COORDINATION
Left message to return phone call to complete UC follow up. Eligible for enrollment/HOPR report. Will try again at another time.

## 2021-06-19 LAB
SARS-COV-2: NOT DETECTED
SOURCE: NORMAL

## 2021-06-28 ENCOUNTER — CARE COORDINATION (OUTPATIENT)
Dept: CARE COORDINATION | Age: 41
End: 2021-06-28

## 2021-07-13 ENCOUNTER — TELEPHONE (OUTPATIENT)
Dept: FAMILY MEDICINE CLINIC | Age: 41
End: 2021-07-13

## 2021-09-01 ENCOUNTER — HOSPITAL ENCOUNTER (EMERGENCY)
Age: 41
Discharge: HOME | End: 2021-09-01
Payer: COMMERCIAL

## 2021-09-01 VITALS
BODY MASS INDEX: 24.33 KG/M2 | OXYGEN SATURATION: 100 % | TEMPERATURE: 97.5 F | HEART RATE: 80 BPM | SYSTOLIC BLOOD PRESSURE: 133 MMHG | HEIGHT: 67 IN | DIASTOLIC BLOOD PRESSURE: 87 MMHG | RESPIRATION RATE: 16 BRPM | WEIGHT: 155 LBS

## 2021-09-01 DIAGNOSIS — Z11.52 ENCOUNTER FOR SCREENING FOR COVID-19: Primary | ICD-10-CM

## 2021-09-01 DIAGNOSIS — R05.9 COUGH: ICD-10-CM

## 2021-09-01 PROCEDURE — 99213 OFFICE O/P EST LOW 20 MIN: CPT

## 2021-09-01 PROCEDURE — 99213 OFFICE O/P EST LOW 20 MIN: CPT | Performed by: NURSE PRACTITIONER

## 2021-09-01 RX ORDER — CETIRIZINE HYDROCHLORIDE 10 MG/1
10 TABLET ORAL DAILY
Qty: 30 TABLET | Refills: 0 | Status: SHIPPED | OUTPATIENT
Start: 2021-09-01 | End: 2021-10-01

## 2021-09-01 RX ORDER — GUAIFENESIN, PSEUDOEPHEDRINE HYDROCHLORIDE 600; 60 MG/1; MG/1
1 TABLET, EXTENDED RELEASE ORAL EVERY 12 HOURS
Qty: 28 TABLET | Refills: 0 | Status: SHIPPED | OUTPATIENT
Start: 2021-09-01 | End: 2021-09-15

## 2021-09-01 RX ORDER — BROMPHENIRAMINE MALEATE, PSEUDOEPHEDRINE HYDROCHLORIDE, AND DEXTROMETHORPHAN HYDROBROMIDE 2; 30; 10 MG/5ML; MG/5ML; MG/5ML
10 SYRUP ORAL 4 TIMES DAILY PRN
Qty: 118 ML | Refills: 0 | Status: SHIPPED | OUTPATIENT
Start: 2021-09-01 | End: 2022-04-07

## 2021-09-01 ASSESSMENT — ENCOUNTER SYMPTOMS
VOMITING: 0
DIARRHEA: 0
RHINORRHEA: 0
SORE THROAT: 0
NAUSEA: 0
SHORTNESS OF BREATH: 0
COUGH: 1
CHEST TIGHTNESS: 1

## 2021-09-01 ASSESSMENT — PAIN DESCRIPTION - LOCATION: LOCATION: CHEST

## 2021-09-01 NOTE — ED TRIAGE NOTES
To ROOM 3 C/O COUGH AND CHEST CONGESTION NOT GETTING BETTER.   WORKS AT [employer] AND HAD A NEGATIVE RAPID AND SEND OUT IS PENDING FOR COVID

## 2021-09-01 NOTE — ED PROVIDER NOTES
[facility]  Urgent Care Encounter       CHIEF COMPLAINT       Chief Complaint   Patient presents with    Cough     CHEST HURTS  AND CONGESTED. RAPID COVID NEGATIVE. AT WORK. SEND OIUT PENDING       Nurses Notes reviewed and I agree except as noted in the HPI. HISTORY OF PRESENT ILLNESS   Nelly Mustafa is a 39 y.o. female who presents to the [address] urgent care for evaluation of cough. She reports mild chest congestion. She does report that she is an [address] in a nursing facility. She reports having a rapid Covid being negative. She reports the PCR send out is currently pending. She is here requesting medications to alleviate symptoms. The history is provided by the patient. No  was used. REVIEW OF SYSTEMS     Review of Systems   Constitutional: Negative for activity change, appetite change, chills, fatigue and fever. HENT: Negative for ear discharge, ear pain, rhinorrhea and sore throat. Respiratory: Positive for cough and chest tightness. Negative for shortness of breath. Cardiovascular: Negative for chest pain. Gastrointestinal: Negative for diarrhea, nausea and vomiting. Genitourinary: Negative for dysuria. Skin: Negative for rash. Allergic/Immunologic: Negative for environmental allergies and food allergies. Neurological: Negative for dizziness and headaches. PAST MEDICAL HISTORY         Diagnosis Date    Atrophic kidney, acquired     Left     Essential hypertension 2016    GERD (gastroesophageal reflux disease)     History of blood transfusion     Insomnia 2014    Migraine headache     Nephrolithiasis     Nicotine dependence     Recurrent UTI        SURGICALHISTORY     Patient  has a past surgical history that includes  section (,,); Lithotripsy (off and on since ); Tubal ligation (); other surgical history (10/21/2016); and Kidney removal (Left).     CURRENT MEDICATIONS Discharge Medication List as of 9/1/2021  4:00 PM      CONTINUE these medications which have NOT CHANGED    Details   amLODIPine (NORVASC) 10 MG tablet Take 1 tablet by mouth daily, Disp-90 tablet,R-3Normal      azelastine (ASTELIN) 0.1 % nasal spray 1 spray by Nasal route 2 times daily Use in each nostril as directed, Disp-1 Bottle, R-0Normal      Blood Pressure KIT DAILY Starting Fri 1/17/2020, Disp-1 kit, R-0, Normal      acetaminophen (TYLENOL) 325 MG tablet Take 1,000 mg by mouth every 6 hours as needed for Pain Historical Med             ALLERGIES     Patient is has No Known Allergies. Patients   Immunization History   Administered Date(s) Administered    Influenza Virus Vaccine 12/07/2018    Td, (Adult) Not Adsorbed 12/24/2019       FAMILY HISTORY     Patient's family history includes Diabetes in her maternal grandmother; High Blood Pressure in her mother. SOCIAL HISTORY     Patient  reports that she has been smoking cigarettes. She has a 5.00 pack-year smoking history. She has never used smokeless tobacco. She reports previous alcohol use. She reports that she does not use drugs. PHYSICAL EXAM     ED TRIAGE VITALS  BP: 133/87, Temp: 97.5 °F (36.4 °C), Pulse: 80, Resp: 16, SpO2: 100 %,Estimated body mass index is 24.28 kg/m² as calculated from the following:    Height as of this encounter: 5' 7\" (1.702 m). Weight as of this encounter: 155 lb (70.3 kg). ,Patient's last menstrual period was 08/22/2021. Physical Exam  Vitals and nursing note reviewed. Constitutional:       General: She is not in acute distress. Appearance: Normal appearance. She is not ill-appearing, toxic-appearing or diaphoretic. HENT:      Head: Normocephalic. Right Ear: Ear canal and external ear normal.      Left Ear: Ear canal and external ear normal.      Nose: Nose normal. No congestion or rhinorrhea. Mouth/Throat:      Mouth: Mucous membranes are moist.      Pharynx: Oropharynx is clear.  No oropharyngeal exudate or posterior oropharyngeal erythema. Cardiovascular:      Rate and Rhythm: Normal rate. Pulses: Normal pulses. Pulmonary:      Effort: Pulmonary effort is normal. No respiratory distress. Breath sounds: No stridor. No wheezing or rhonchi. Abdominal:      General: Abdomen is flat. Bowel sounds are normal.      Palpations: Abdomen is soft. Musculoskeletal:         General: No swelling or tenderness. Normal range of motion. Cervical back: Normal range of motion. Neurological:      General: No focal deficit present. Mental Status: She is alert and oriented to person, place, and time. Psychiatric:         Mood and Affect: Mood normal.         Behavior: Behavior normal.         DIAGNOSTIC RESULTS     Labs:No results found for this visit on 09/01/21. IMAGING:    No orders to display         EKG: None      URGENT CARE COURSE:     Vitals:    09/01/21 1548   BP: 133/87   Pulse: 80   Resp: 16   Temp: 97.5 °F (36.4 °C)   SpO2: 100%   Weight: 155 lb (70.3 kg)   Height: 5' 7\" (1.702 m)       Medications - No data to display         PROCEDURES:  None    FINAL IMPRESSION      1. Encounter for screening for COVID-19    2. Cough          DISPOSITION/ PLAN     Patient seen and evaluated for cough and chest congestion. She does have a pending Covid test at work. She is provided prescriptions for Bromfed-DM and Mucinex D. She is instructed to follow-up with her PCP in 3 to 5 days with new or worsening symptoms. She is instructed to use over-the-counter Tylenol Motrin for pain or fever. She is agreeable with the above plan and denies questions or concerns at this time.       PATIENT REFERRED TO:  Army Tavo Orozco / Margareth Inch [id number]      DISCHARGE MEDICATIONS:  Discharge Medication List as of 9/1/2021  4:00 PM      START taking these medications    Details   brompheniramine-pseudoephedrine-DM 2-30-10 MG/5ML syrup Take 10 mLs by mouth 4 times daily as needed for Congestion or Cough, Disp-118 mL, R-0Normal      pseudoephedrine-guaiFENesin (MUCINEX D)  MG per extended release tablet Take 1 tablet by mouth every 12 hours for 14 days, Disp-28 tablet, R-0Normal             Discharge Medication List as of 9/1/2021  4:00 PM      STOP taking these medications       varenicline (CHANTIX STARTING MONTH PAK) 0.5 MG X 11 & 1 MG X 42 tablet Comments:   Reason for Stopping:               Discharge Medication List as of 9/1/2021  4:00 PM      CONTINUE these medications which have CHANGED    Details   cetirizine (ZYRTEC) 10 MG tablet Take 1 tablet by mouth daily, Disp-30 tablet, R-0Normal             PETER Murrell CNP    (Please note that portions of this note were completed with a voice recognition program. Efforts were made to edit the dictations but occasionally words are mis-transcribed.)           PETER Murrell CNP  09/01/21 2946

## 2022-04-07 NOTE — PROGRESS NOTES
Chief Complaint   Patient presents with    Annual Exam     Well visit/Work physical       History obtained from the patient. SUBJECTIVE:  Yesenia Blanchard is a 43 y.o. female that presents today for     -Prev Med: Vaccines reviewed. Reviewed if routine labs are due. Denies family history of colon, prostate or ovarian cancer. Maternal GM with breast cancer age 76. Denies breast or bowl habit changes. Denies fevers, chills, night sweats or wt loss. Diet - good  Exercise - good  Sleep - good    Hx of HTN:  Pt hasn't taken norvasc 10mg consistent in a while, as it makes her light headed  Has inc exercise and lost wt  Not checking BP's  Did not take meds last few days and Bp's WNL today  Due for labs    Pt smoking, 1/2 ppd, wants to quit. Age/Gender Health Maintenance    Lipid -   Lab Results   Component Value Date    CHOL 132 02/24/2017    CHOL 141 02/12/2016    CHOL 148 05/28/2013     Lab Results   Component Value Date    TRIG 97 02/24/2017    TRIG 114 02/12/2016    TRIG 132 05/28/2013     Lab Results   Component Value Date    HDL 33 02/24/2017    HDL 34 02/12/2016    HDL 37 05/28/2013     Lab Results   Component Value Date    LDLCALC 80 02/24/2017    LDLCALC 84 02/12/2016    LDLCALC 109 05/28/2013     Lab Results   Component Value Date    VLDL 26 05/28/2013     No results found for: CHOLHDLRATIO      DM Screen -   Lab Results   Component Value Date    GLUCOSE 89 02/24/2017         Colon Cancer Screening - age 39    Tetanus - UTD APR 2022  Influenza Vaccine - Candidate FALL 2022  Pneumonia Vaccine - Declines APR 2022  Zoster - age 48    Breast Cancer Screening - Due, ordered  Cervical Cancer Screening - NEG DEC 2012, records pending 2019 Dr. Fernanda Emerson.    Osteoporosis Screening - Age 72    Falls screening - N/A      Current Outpatient Medications   Medication Sig Dispense Refill    azelastine (ASTELIN) 0.1 % nasal spray 1 spray by Nasal route 2 times daily Use in each nostril as directed 1 Bottle 0    Blood Pressure KIT 1 kit by Does not apply route daily 1 kit 0    acetaminophen (TYLENOL) 325 MG tablet Take 1,000 mg by mouth every 6 hours as needed for Pain       nicotine (NICODERM CQ) 14 MG/24HR Place 1 patch onto the skin every 24 hours X 6 weeks. Remove patch at bedtime. Reapply new patch next morning 42 patch 0    nicotine (NICODERM CQ) 7 MG/24HR Place 1 patch onto the skin every 24 hours for 14 days X 2 weeks. Remove patch at bedtime. Reapply new patch next morning 14 patch 0    nicotine polacrilex (NICORETTE) 2 MG gum Take 1 each by mouth as needed for Smoking cessation 110 each 3     No current facility-administered medications for this visit. No orders of the defined types were placed in this encounter. All medications reviewed and reconciled, including OTC and herbal medications. Updated list given to patient. Patient Active Problem List    Diagnosis Date Noted    XGP (xanthogranulomatous pyelonephritis)     Hematuria     Obstruction of left ureteropelvic junction (UPJ) due to stone     Left ureteral stone 09/15/2016    Essential hypertension 2016    Seborrheic dermatitis     Biliary colic     Insomnia     Atrophic kidney, acquired      Left      Nicotine dependence     Migraine headache     Recurrent UTI     GERD (gastroesophageal reflux disease)     Nephrolithiasis        Past Medical History:   Diagnosis Date    Atrophic kidney, acquired     Left     Essential hypertension 2016    GERD (gastroesophageal reflux disease)     History of blood transfusion 1980    Insomnia 2014    Migraine headache     Nephrolithiasis     Nicotine dependence     Recurrent UTI        Past Surgical History:   Procedure Laterality Date     SECTION  ,,    KIDNEY REMOVAL Left     complete    LITHOTRIPSY  off and on since     OTHER SURGICAL HISTORY  10/21/2016    Robotic Left Nephroureterectomy - Dr. Paco Goldman.     TUBAL LIGATION  2011       No Known Allergies      Social History     Tobacco Use    Smoking status: Current Every Day Smoker     Packs/day: 0.50     Years: 10.00     Pack years: 5.00     Types: Cigarettes    Smokeless tobacco: Never Used    Tobacco comment: information given at past appts for smoking cessation   Substance Use Topics    Alcohol use: Not Currently     Comment: OCC         Family History   Problem Relation Age of Onset    High Blood Pressure Mother     Diabetes Maternal Grandmother     Breast Cancer Maternal Grandmother 66    Colon Cancer Neg Hx          I have reviewed the patient's past medical history, past surgical history, allergies, medications, social and family history and I have made updates where appropriate.       Review of Systems  Positive responses are highlighted in bold    Constitutional:  Fever, Chills, Night Sweats, Fatigue, Unexpected changes in weight  Eyes:  Eye discharge, Eye pain, Eye redness, Visual disturbances   HENT:  Ear pain, Tinnitus, Nosebleeds, Trouble swallowing, Hearing loss, Sore throat  Cardiovascular:  Chest Pain, Palpitations, Orthopnea, Paroxysmal Nocturnal Dyspnea  Respiratory:  Cough, Wheezing, Shortness of breath, Chest tightness, Apnea  Gastrointestinal:  Nausea, Vomiting, Diarrhea, Constipation, Heartburn, Blood in stool  Genitourinary:  Difficulty or painful urination, Flank pain, Change in frequency, Urgency  Skin:  Color change, Rash, Itching, Wound  Psychiatric:  Hallucinations, Anxiety, Depression, Suicidal ideation  Hematological:  Enlarged glands, Easy bleeding, Easily bruising  Musculoskeletal:  Joint pain, Back pain, Gait problems, Joint swelling, Myalgias  Neurological:  Dizziness, Headaches, Presyncope, Numbness, Seizures, Tremors  Allergy:  Environmental allergies, Food allergies  Endocrine:  Heat Intolerance, Cold Intolerance, Polydipsia, Polyphagia, Polyuria      PHYSICAL EXAM:  Vitals:    04/11/22 1049   BP: 128/64   Pulse: 77   Resp: 12 Temp: 98 °F (36.7 °C)   TempSrc: Oral   Weight: 163 lb (73.9 kg)   Height: 5' 6.5\" (1.689 m)     Body mass index is 25.91 kg/m². VS Reviewed  General Appearance: A&O x 3, No acute distress,well developed and well- nourished  Head: normocephalic and atraumatic  Eyes: pupils equal, round, and reactive to light, extraocular eye movements intact, conjunctivae and eye lids without erythema  ENT: external ear and ear canal clear bilaterally, TMs intact and regular, nose without deformity, nasal mucosa and turbinates normal without polyps, oropharynx normal, dentition is normal for age  Neck: supple and non-tender without mass, no thyromegaly or thyroid nodules, no cervical lymphadenopathy  Pulmonary/Chest: clear to auscultation bilaterally- no wheezes, rales or rhonchi, normal air movement, no respiratory distress or retractions  Cardiovascular: S1 and S2 auscultated w/ RRR. No murmurs, rubs, clicks, or gallops, distal pulses intact. Abdomen: soft, non-tender, non-distended, bowel sounds physiologic,  no rebound or guarding, no masses or hernias noted. Liver and spleen without enlargement. Extremities: no cyanosis, clubbing or edema of the lower extremities. +2 PT/DP bilaterally. Musculoskeletal: No joint swelling or gross deformity   Neuro:  Alert, 5/5 strength globally and symmetrically, 2+ patellar reflexes bilaterally  Psych: Affect appropriate. Mood normal. Thought process is normal without evidence of depression or psychosis. Good insight and appropriate interaction. Cognition and memory appear to be intact. Skin: warm and dry, no rash or erythema  Lymph:  No cervical, auricular or supraclavicular lymph nodes palpated      ASSESSMENT & PLAN  1. Visit for preventive health examination    Vaccines reviewed and update recommendations made  Routine labs ordered    For BP's, con't to hold norvasc for now. Check BP daily x 2 wks.  Will call her in 2 wks with log  If high, resume norvasc, but at 2.5 or 5mg  If ok, con't off    For smoking, start patches/gum  F/u if unable to quit    - CBC with Auto Differential; Future  - Comprehensive Metabolic Panel; Future  - Lipid Panel; Future    2. Encounter for screening mammogram for malignant neoplasm of breast    - DEIDRE ANGEL DIGITAL SCREEN BILATERAL; Future    3. Need for Tdap vaccination    - Tdap (age 6y and older) IM ([address])      [address]    Return in about 1 year (around 4/11/2023) for routine well visit, sooner as needed. Jenaro Flanagan released without restrictions. PATIENT COUNSELING    Jenaro Flanagan received counseling on the following healthy behaviors: nutrition, exercise, medication adherence and tobacco cessation     Barriers to learning and self management: none    Discussed use, benefit, and side effects of prescribed medications. Barriers to medication compliance addressed. All patient questions answered. Pt voiced understanding.        Electronically signed by Janee Rojas DO on 4/11/2022 at 11:30 AM

## 2022-04-11 ENCOUNTER — OFFICE VISIT (OUTPATIENT)
Dept: FAMILY MEDICINE CLINIC | Age: 42
End: 2022-04-11
Payer: COMMERCIAL

## 2022-04-11 VITALS
BODY MASS INDEX: 25.58 KG/M2 | TEMPERATURE: 98 F | RESPIRATION RATE: 12 BRPM | DIASTOLIC BLOOD PRESSURE: 64 MMHG | SYSTOLIC BLOOD PRESSURE: 128 MMHG | HEART RATE: 77 BPM | WEIGHT: 163 LBS | HEIGHT: 67 IN

## 2022-04-11 DIAGNOSIS — Z12.31 ENCOUNTER FOR SCREENING MAMMOGRAM FOR MALIGNANT NEOPLASM OF BREAST: ICD-10-CM

## 2022-04-11 DIAGNOSIS — Z23 NEED FOR TDAP VACCINATION: ICD-10-CM

## 2022-04-11 DIAGNOSIS — Z00.00 VISIT FOR PREVENTIVE HEALTH EXAMINATION: Primary | ICD-10-CM

## 2022-04-11 DIAGNOSIS — F17.210 CIGARETTE NICOTINE DEPENDENCE WITHOUT COMPLICATION: Primary | ICD-10-CM

## 2022-04-11 PROCEDURE — 99396 PREV VISIT EST AGE 40-64: CPT | Performed by: FAMILY MEDICINE

## 2022-04-11 PROCEDURE — 90715 TDAP VACCINE 7 YRS/> IM: CPT | Performed by: FAMILY MEDICINE

## 2022-04-11 RX ORDER — NICOTINE 21 MG/24HR
1 PATCH, TRANSDERMAL 24 HOURS TRANSDERMAL EVERY 24 HOURS
Qty: 42 PATCH | Refills: 0 | Status: SHIPPED | OUTPATIENT
Start: 2022-04-11 | End: 2022-09-22

## 2022-04-11 ASSESSMENT — PATIENT HEALTH QUESTIONNAIRE - PHQ9
SUM OF ALL RESPONSES TO PHQ QUESTIONS 1-9: 0
SUM OF ALL RESPONSES TO PHQ QUESTIONS 1-9: 0
2. FEELING DOWN, DEPRESSED OR HOPELESS: 0
1. LITTLE INTEREST OR PLEASURE IN DOING THINGS: 0
SUM OF ALL RESPONSES TO PHQ QUESTIONS 1-9: 0
SUM OF ALL RESPONSES TO PHQ QUESTIONS 1-9: 0
SUM OF ALL RESPONSES TO PHQ9 QUESTIONS 1 & 2: 0

## 2022-04-11 NOTE — PROGRESS NOTES
Immunization(s) given during visit:    Immunizations Administered     Name Date Dose Route    Tdap (Boostrix, Adacel) 4/11/2022 0.5 mL Intramuscular    Site: Deltoid- Right    Tigist Miller    NDC: 88554-559-98          Most recent Vaccine Information Sheet dated 08/06/21 given to pt

## 2022-04-11 NOTE — PATIENT INSTRUCTIONS
LAB INSTRUCTIONS:    Please complete labs within 4 week(s). Please fast for 8 hours prior to lab collection. The clinic will call you within 1 week of collection. If you have not heard from us within that amount of time, please call us at [phone].

## 2022-04-25 ENCOUNTER — TELEPHONE (OUTPATIENT)
Dept: FAMILY MEDICINE CLINIC | Age: 42
End: 2022-04-25

## 2022-04-25 NOTE — TELEPHONE ENCOUNTER
----- Message from Henry Mast DO sent at 4/24/2022 11:39 AM EDT -----  Please call pt and see how BP's are off norvasc 10mg x 2 wks   Let me know, thanks!

## 2022-06-27 ENCOUNTER — HOSPITAL ENCOUNTER (EMERGENCY)
Age: 42
Discharge: HOME | End: 2022-06-27
Payer: COMMERCIAL

## 2022-06-27 VITALS
WEIGHT: 155 LBS | RESPIRATION RATE: 16 BRPM | TEMPERATURE: 97.8 F | OXYGEN SATURATION: 100 % | SYSTOLIC BLOOD PRESSURE: 146 MMHG | BODY MASS INDEX: 24.64 KG/M2 | DIASTOLIC BLOOD PRESSURE: 68 MMHG | HEART RATE: 60 BPM

## 2022-06-27 DIAGNOSIS — Z87.442 PERSONAL HISTORY OF KIDNEY STONES: ICD-10-CM

## 2022-06-27 DIAGNOSIS — N12 PYELONEPHRITIS OF RIGHT KIDNEY: Primary | ICD-10-CM

## 2022-06-27 LAB
BASOPHILS # BLD: 0.3 %
BASOPHILS ABSOLUTE: 0 THOU/MM3 (ref 0–0.1)
BILIRUBIN URINE: NEGATIVE
BLOOD, URINE: ABNORMAL
BUN WHOLE BLOOD, UC: 11 MG/DL (ref 8–26)
CHARACTER, URINE: ABNORMAL
CHLORIDE, WHOLE BLOOD: 103 MEQ/L (ref 98–109)
CO2, WHOLE BLOOD: 22 MEQ/L (ref 23–33)
COLOR: ABNORMAL
CREATININE WHOLE BLOOD, UC: 1 MG/DL (ref 0.5–1.2)
EOSINOPHIL # BLD: 0.5 %
EOSINOPHILS ABSOLUTE: 0 THOU/MM3 (ref 0–0.4)
GFR, ESTIMATED: 64 ML/MIN/1.73M2
GLUCOSE URINE: NEGATIVE MG/DL
GLUCOSE, WHOLE BLOOD: 92 MG/DL (ref 70–108)
HCT VFR BLD CALC: 38.2 % (ref 37–47)
HEMOGLOBIN: 13 GM/DL (ref 12–16)
IMMATURE GRANS (ABS): 0.02 THOU/MM3 (ref 0–0.07)
IMMATURE GRANULOCYTES: 0.3 %
KETONES, URINE: 15
LEUKOCYTE ESTERASE, URINE: ABNORMAL
LYMPHOCYTES # BLD: 16.5 %
LYMPHOCYTES ABSOLUTE: 1 THOU/MM3 (ref 1–4.8)
MCH RBC QN AUTO: 33 PG (ref 27–31)
MCHC RBC AUTO-ENTMCNC: 34 GM/DL (ref 33–37)
MCV RBC AUTO: 97 FL (ref 81–99)
MONOCYTES # BLD: 10.4 %
MONOCYTES ABSOLUTE: 0.7 THOU/MM3 (ref 0.4–1.3)
NITRITE, URINE: POSITIVE
NUCLEATED RED BLOOD CELLS: 0 /100 WBC
PDW BLD-RTO: 12.6 % (ref 11.5–14.5)
PH, URINE: 6 (ref 5–9)
PLATELET # BLD: 152 THOU/MM3 (ref 130–400)
PMV BLD AUTO: 10.1 FL (ref 7.4–10.4)
POTASSIUM, WHOLE BLOOD: 4 MEQ/L (ref 3.5–4.9)
PROTEIN, URINE: 100 MG/DL
RBC # BLD: 3.94 MILL/MM3 (ref 4.2–5.4)
SEG NEUTROPHILS: 72 %
SEGMENTED NEUTROPHILS ABSOLUTE COUNT: 4.5 THOU/MM3 (ref 1.8–7.7)
SODIUM, WHOLE BLOOD: 137 MEQ/L (ref 138–146)
SPECIFIC GRAVITY, URINE: 1.02 (ref 1–1.03)
UROBILINOGEN, URINE: 1 EU/DL (ref 0.2–1)
WBC # BLD: 6.3 THOU/MM3 (ref 4.8–10.8)

## 2022-06-27 PROCEDURE — 96375 TX/PRO/DX INJ NEW DRUG ADDON: CPT

## 2022-06-27 PROCEDURE — 2580000003 HC RX 258: Performed by: NURSE PRACTITIONER

## 2022-06-27 PROCEDURE — 82947 ASSAY GLUCOSE BLOOD QUANT: CPT

## 2022-06-27 PROCEDURE — 36415 COLL VENOUS BLD VENIPUNCTURE: CPT

## 2022-06-27 PROCEDURE — 81003 URINALYSIS AUTO W/O SCOPE: CPT

## 2022-06-27 PROCEDURE — 85025 COMPLETE CBC W/AUTO DIFF WBC: CPT

## 2022-06-27 PROCEDURE — 80051 ELECTROLYTE PANEL: CPT

## 2022-06-27 PROCEDURE — 87077 CULTURE AEROBIC IDENTIFY: CPT

## 2022-06-27 PROCEDURE — 6360000002 HC RX W HCPCS: Performed by: NURSE PRACTITIONER

## 2022-06-27 PROCEDURE — 99214 OFFICE O/P EST MOD 30 MIN: CPT

## 2022-06-27 PROCEDURE — 82565 ASSAY OF CREATININE: CPT

## 2022-06-27 PROCEDURE — 84520 ASSAY OF UREA NITROGEN: CPT

## 2022-06-27 PROCEDURE — 87086 URINE CULTURE/COLONY COUNT: CPT

## 2022-06-27 PROCEDURE — 99213 OFFICE O/P EST LOW 20 MIN: CPT

## 2022-06-27 PROCEDURE — 87186 SC STD MICRODIL/AGAR DIL: CPT

## 2022-06-27 PROCEDURE — 96365 THER/PROPH/DIAG IV INF INIT: CPT

## 2022-06-27 PROCEDURE — 99214 OFFICE O/P EST MOD 30 MIN: CPT | Performed by: NURSE PRACTITIONER

## 2022-06-27 RX ORDER — SODIUM CHLORIDE 9 MG/ML
INJECTION, SOLUTION INTRAVENOUS ONCE
Status: DISCONTINUED | OUTPATIENT
Start: 2022-06-27 | End: 2022-06-27 | Stop reason: HOSPADM

## 2022-06-27 RX ORDER — KETOROLAC TROMETHAMINE 30 MG/ML
30 INJECTION, SOLUTION INTRAMUSCULAR; INTRAVENOUS ONCE
Status: COMPLETED | OUTPATIENT
Start: 2022-06-27 | End: 2022-06-27

## 2022-06-27 RX ORDER — CEFDINIR 300 MG/1
300 CAPSULE ORAL 2 TIMES DAILY
Qty: 20 CAPSULE | Refills: 0 | Status: SHIPPED | OUTPATIENT
Start: 2022-06-27 | End: 2022-07-07

## 2022-06-27 RX ORDER — ASPIRIN/SALICYLAMIDE/CAFFEINE 650-195-32
PACKET (EA) ORAL
COMMUNITY
End: 2022-09-22

## 2022-06-27 RX ADMIN — KETOROLAC TROMETHAMINE 30 MG: 30 INJECTION, SOLUTION INTRAMUSCULAR; INTRAVENOUS at 13:14

## 2022-06-27 RX ADMIN — CEFTRIAXONE SODIUM 1000 MG: 1 INJECTION, POWDER, FOR SOLUTION INTRAMUSCULAR; INTRAVENOUS at 13:21

## 2022-06-27 ASSESSMENT — PAIN SCALES - GENERAL
PAINLEVEL_OUTOF10: 7
PAINLEVEL_OUTOF10: 2
PAINLEVEL_OUTOF10: 7
PAINLEVEL_OUTOF10: 2

## 2022-06-27 ASSESSMENT — ENCOUNTER SYMPTOMS
NAUSEA: 1
RHINORRHEA: 0
COUGH: 0
BACK PAIN: 1
ABDOMINAL PAIN: 0
SHORTNESS OF BREATH: 0

## 2022-06-27 ASSESSMENT — PAIN - FUNCTIONAL ASSESSMENT
PAIN_FUNCTIONAL_ASSESSMENT: ACTIVITIES ARE NOT PREVENTED
PAIN_FUNCTIONAL_ASSESSMENT: 0-10
PAIN_FUNCTIONAL_ASSESSMENT: 0-10

## 2022-06-27 ASSESSMENT — PAIN DESCRIPTION - LOCATION
LOCATION: BACK
LOCATION: BACK
LOCATION: BACK;HEAD
LOCATION: BACK

## 2022-06-27 ASSESSMENT — PAIN DESCRIPTION - DESCRIPTORS: DESCRIPTORS: ACHING;SHARP

## 2022-06-27 ASSESSMENT — PAIN DESCRIPTION - PAIN TYPE: TYPE: ACUTE PAIN

## 2022-06-27 ASSESSMENT — PAIN DESCRIPTION - FREQUENCY: FREQUENCY: CONTINUOUS

## 2022-06-27 NOTE — ED TRIAGE NOTES
Pt to room 2 with c/o dysuria along with a head ache, body aches and chills x 4 days. She took a home Covid test and it was negative.

## 2022-06-27 NOTE — ED PROVIDER NOTES
[facility]  Urgent Care Encounter       CHIEF COMPLAINT       Chief Complaint   Patient presents with    Dysuria    Fever     with chills and body aches        Nurses Notes reviewed and I agree except as noted in the HPI. HISTORY OF PRESENT ILLNESS   Anabell Gardner is a 43 y.o. female who presents to the urgent care center complaining of dysuria along with a headache and body aches and chills. Patient stated symptoms have been about 4 days. She states that she did a at home COVID test which was negative. Patient at the present time complains of headache and back pain which she rates 7 on a 10 scale. Patient apparently felt that she had fever due to her chills and body aches. The history is provided by the patient. No  was used. Dysuria   This is a new problem. The current episode started more than 2 days ago (4 days). The problem has not changed since onset. The patient is experiencing no pain. There has been no fever. There is a history of pyelonephritis. Associated symptoms include chills, nausea, hesitancy, urgency and flank pain. Her past medical history is significant for urological procedure and recurrent UTIs. Past medical history comments: nephrectomy (left). Fever  Associated symptoms: chills, dysuria and nausea    Associated symptoms: no cough, no ear pain and no rhinorrhea        REVIEW OF SYSTEMS     Review of Systems   Constitutional: Positive for chills and fever. HENT: Negative for ear pain and rhinorrhea. Respiratory: Negative for cough and shortness of breath. Gastrointestinal: Positive for nausea. Negative for abdominal pain. Genitourinary: Positive for dysuria, flank pain, hesitancy and urgency. Musculoskeletal: Positive for back pain. Negative for neck pain and neck stiffness.        PAST MEDICAL HISTORY         Diagnosis Date    Atrophic kidney, acquired     Left     Essential hypertension 2/12/2016    GERD (gastroesophageal reflux disease)     History of blood transfusion 1980    Insomnia 2014    Migraine headache     Nephrolithiasis     Nicotine dependence     Recurrent UTI        SURGICALHISTORY     Patient  has a past surgical history that includes  section (,,); Lithotripsy (off and on since ); Tubal ligation (); other surgical history (10/21/2016); and Kidney removal (Left). CURRENT MEDICATIONS       Previous Medications    ACETAMINOPHEN (TYLENOL) 325 MG TABLET    Take 1,000 mg by mouth every 6 hours as needed for Pain     ASPIRIN-SALICYLAMIDE-CAFFEINE (BC FAST PAIN RELIEF) 650-195-33.3 MG PACK    Take by mouth    AZELASTINE (ASTELIN) 0.1 % NASAL SPRAY    1 spray by Nasal route 2 times daily Use in each nostril as directed    BLOOD PRESSURE KIT    1 kit by Does not apply route daily    NICOTINE (NICODERM CQ) 14 MG/24HR    Place 1 patch onto the skin every 24 hours X 6 weeks. Remove patch at bedtime. Reapply new patch next morning    NICOTINE (NICODERM CQ) 7 MG/24HR    Place 1 patch onto the skin every 24 hours for 14 days X 2 weeks. Remove patch at bedtime. Reapply new patch next morning    NICOTINE POLACRILEX (NICORETTE) 2 MG GUM    Take 1 each by mouth as needed for Smoking cessation       ALLERGIES     Patient is has No Known Allergies. Patients   Immunization History   Administered Date(s) Administered    Influenza Virus Vaccine 2018    Td, (Adult) Not Adsorbed 2019    Tdap (Boostrix, Adacel) 2022       FAMILY HISTORY     Patient's family history includes Breast Cancer (age of onset: 66) in her maternal grandmother; Diabetes in her maternal grandmother; High Blood Pressure in her mother. SOCIAL HISTORY     Patient  reports that she has been smoking cigarettes. She has a 5.00 pack-year smoking history. She has never used smokeless tobacco. She reports previous alcohol use. She reports that she does not use drugs.     PHYSICAL EXAM     ED TRIAGE VITALS  BP: (!) 143/89, Temp: 97.5 °F (36.4 °C), Heart Rate: 88, Resp: 16, SpO2: 100 %,Estimated body mass index is 24.64 kg/m² as calculated from the following:    Height as of 4/11/22: 5' 6.5\" (1.689 m). Weight as of this encounter: 155 lb (70.3 kg). ,Patient's last menstrual period was 06/18/2022. Physical Exam  Vitals and nursing note reviewed. Constitutional:       General: She is not in acute distress. Appearance: Normal appearance. She is well-developed. She is not ill-appearing, toxic-appearing or diaphoretic. HENT:      Head: Normocephalic. Right Ear: Hearing, tympanic membrane, ear canal and external ear normal. No drainage, swelling or tenderness. No mastoid tenderness. Left Ear: Hearing, tympanic membrane, ear canal and external ear normal. No drainage, swelling or tenderness. No mastoid tenderness. Nose: Nose normal.      Right Sinus: No maxillary sinus tenderness or frontal sinus tenderness. Left Sinus: No maxillary sinus tenderness or frontal sinus tenderness. Mouth/Throat:      Lips: Pink. No lesions. Mouth: Mucous membranes are moist.      Pharynx: Uvula midline. No posterior oropharyngeal erythema. Tonsils: No tonsillar exudate or tonsillar abscesses. Eyes:      Conjunctiva/sclera: Conjunctivae normal.      Pupils: Pupils are equal, round, and reactive to light. Cardiovascular:      Rate and Rhythm: Normal rate and regular rhythm. Heart sounds: Normal heart sounds. Pulmonary:      Effort: Pulmonary effort is normal. No accessory muscle usage. Breath sounds: Normal breath sounds. No decreased breath sounds, wheezing, rhonchi or rales. Chest:   Breasts:      Right: No supraclavicular adenopathy. Left: No supraclavicular adenopathy. Abdominal:      General: Abdomen is flat. Bowel sounds are normal. There is no distension. Palpations: Abdomen is soft. Tenderness:  There is abdominal tenderness in the right lower quadrant, suprapubic area and left lower quadrant. There is right CVA tenderness. There is no left CVA tenderness, guarding or rebound. Negative signs include Verduzco's sign. Musculoskeletal:         General: Normal range of motion. Cervical back: Full passive range of motion without pain and normal range of motion. No rigidity. Right knee: Normal range of motion. Left knee: Normal range of motion. Lymphadenopathy:      Head:      Right side of head: No submental, submandibular, tonsillar, preauricular, posterior auricular or occipital adenopathy. Left side of head: No submental, submandibular, tonsillar, preauricular, posterior auricular or occipital adenopathy. Cervical: No cervical adenopathy. Right cervical: No superficial, deep or posterior cervical adenopathy. Left cervical: No superficial, deep or posterior cervical adenopathy. Upper Body:      Right upper body: No supraclavicular adenopathy. Left upper body: No supraclavicular adenopathy. Skin:     General: Skin is warm and dry. Capillary Refill: Capillary refill takes less than 2 seconds. Findings: No rash. Neurological:      General: No focal deficit present. Mental Status: She is alert and oriented to person, place, and time. Psychiatric:         Behavior: Behavior normal. Behavior is cooperative.          DIAGNOSTIC RESULTS     Labs:  Results for orders placed or performed during the hospital encounter of 06/27/22   Urinalysis   Result Value Ref Range    Glucose, Ur Negative NEGATIVE mg/dl    Bilirubin Urine Negative NEGATIVE    Ketones, Urine 15 NEGATIVE    Specific Gravity, Urine 1.025 1.002 - 1.030    Blood, Urine Large (A) NEGATIVE    pH, Urine 6.00 5.0 - 9.0    Protein, Urine 100 (A) NEGATIVE mg/dl    Urobilinogen, Urine 1.00 0.2 - 1.0 eu/dl    Nitrite, Urine Positive (A) NEGATIVE    Leukocyte Esterase, Urine Small (A) NEGATIVE    Color, UA Claudia (A) STRAW-YELLOW    Character, Urine Cloudy (A) CLEAR-SL CLOUD   CBC with Auto Differential   Result Value Ref Range    WBC 6.3 4.8 - 10.8 thou/mm3    RBC 3.94 (L) 4.20 - 5.40 mill/mm3    Hemoglobin 13.0 12.0 - 16.0 gm/dl    Hematocrit 38.2 37.0 - 47.0 %    MCV 97 81 - 99 fL    MCH 33.0 (H) 27.0 - 31.0 pg    MCHC 34.0 33.0 - 37.0 gm/dl    RDW 12.6 11.5 - 14.5 %    Platelets 336 409 - 098 thou/mm3    MPV 10.1 7.4 - 10.4 fL   POC Basic Metabol Panel without Calcium   Result Value Ref Range    Sodium, Whole Blood 137 (L) 138 - 146 meq/l    Potassium, Whole Blood 4.0 3.5 - 4.9 meq/l    Chloride, Whole Blood 103 98 - 109 meq/l    CO2, WHOLE BLOOD 22 (L) 23 - 33 meq/l    Glucose, Whole Blood 92 70 - 108 mg/dl    BUN WHOLE BLOOD, UC 11 8 - 26 mg/dl    CREATININE WHOLE BLOOD, UC 1.0 0.5 - 1.2 mg/dl   Glomerular Filtration Rate, Estimated   Result Value Ref Range    GFR, Estimated 64 ml/min/1.73m2       IMAGING:    No orders to display         EKG:      URGENT CARE COURSE:     Vitals:    06/27/22 1215   BP: (!) 143/89   Pulse: 88   Resp: 16   Temp: 97.5 °F (36.4 °C)   TempSrc: Temporal   SpO2: 100%   Weight: 155 lb (70.3 kg)       Medications   0.9 % sodium chloride infusion ( IntraVENous New Bag 6/27/22 1400)   cefTRIAXone (ROCEPHIN) 1000 mg IVPB in 50 mL D5W minibag (0 mg IntraVENous Stopped 6/27/22 1352)   ketorolac (TORADOL) injection 30 mg (30 mg IntraVENous Given 6/27/22 1314)     1440 p.m. I did review the lab data with the patient reassess patient's pain patient states that she feels much better after receiving Toradol. Patient stated that her headache is better and back pain is much better. IV sites free of infiltrate. Patient did have IV Rocephin infused with no problems. Patient will be prepared for discharge. PROCEDURES:  None    FINAL IMPRESSION      1. Pyelonephritis of right kidney    2.  Personal history of kidney stones          DISPOSITION/ PLAN        Patient or Patient designated representative was advised to drink plenty of water or fluids and take medication as prescribed. The patient or Patient designated representative is advised to monitor for any changes in pain, development of high fever, chills, persistent vomiting, development of increasing back or flank pain or increase in hematuria the patient is advised to go to the emergency department for reevaluation and further follow-up if they wouldn't notice any of the above symptoms. The patient or Patient designated representative was also advised to follow up with family doctor or primary care provider after the antibiotic is completed for repeat urinalysis. The patient did verbalize understanding of discharge instructions and is agreeable to the treatment plan. The patient left ambulatory without any changes or concerns in stable condition.       PATIENT REFERRED TO:  [address] / [place] [facility] [id number]      DISCHARGE MEDICATIONS:  New Prescriptions    CEFDINIR (OMNICEF) 300 MG CAPSULE    Take 1 capsule by mouth 2 times daily for 10 days       Discontinued Medications    No medications on file       Current Discharge Medication List          PETER Michaud CNP    (Please note that portions of this note were completed with a voice recognition program. Efforts were made to edit the dictations but occasionally words are mis-transcribed.)           PETER Michaud CNP  06/27/22 3489

## 2022-06-28 LAB
ORGANISM: ABNORMAL
URINE CULTURE, ROUTINE: ABNORMAL

## 2022-09-13 ENCOUNTER — OFFICE VISIT (OUTPATIENT)
Dept: FAMILY MEDICINE CLINIC | Age: 42
End: 2022-09-13
Payer: COMMERCIAL

## 2022-09-13 VITALS
HEART RATE: 59 BPM | WEIGHT: 164.4 LBS | RESPIRATION RATE: 16 BRPM | DIASTOLIC BLOOD PRESSURE: 86 MMHG | BODY MASS INDEX: 25.8 KG/M2 | SYSTOLIC BLOOD PRESSURE: 138 MMHG | TEMPERATURE: 97.6 F | HEIGHT: 67 IN | OXYGEN SATURATION: 95 %

## 2022-09-13 DIAGNOSIS — N30.00 ACUTE CYSTITIS WITHOUT HEMATURIA: ICD-10-CM

## 2022-09-13 DIAGNOSIS — M54.50 ACUTE BILATERAL LOW BACK PAIN WITHOUT SCIATICA: Primary | ICD-10-CM

## 2022-09-13 DIAGNOSIS — R82.998 LEUKOCYTES IN URINE: ICD-10-CM

## 2022-09-13 DIAGNOSIS — R35.0 URINARY FREQUENCY: ICD-10-CM

## 2022-09-13 DIAGNOSIS — Z87.440 HISTORY OF UTI: ICD-10-CM

## 2022-09-13 LAB
BILIRUBIN URINE: NEGATIVE
BLOOD URINE, POC: ABNORMAL
CHARACTER, URINE: CLEAR
COLOR, URINE: YELLOW
GLUCOSE URINE: NEGATIVE MG/DL
KETONES, URINE: NEGATIVE
LEUKOCYTE CLUMPS, URINE: ABNORMAL
NITRITE, URINE: POSITIVE
PH, URINE: 7 (ref 5–9)
PROTEIN, URINE: NEGATIVE MG/DL
SPECIFIC GRAVITY, URINE: 1.02 (ref 1–1.03)
UROBILINOGEN, URINE: 1 EU/DL (ref 0–1)

## 2022-09-13 PROCEDURE — 99214 OFFICE O/P EST MOD 30 MIN: CPT | Performed by: NURSE PRACTITIONER

## 2022-09-13 PROCEDURE — 1036F TOBACCO NON-USER: CPT | Performed by: NURSE PRACTITIONER

## 2022-09-13 PROCEDURE — G8427 DOCREV CUR MEDS BY ELIG CLIN: HCPCS | Performed by: NURSE PRACTITIONER

## 2022-09-13 PROCEDURE — G8419 CALC BMI OUT NRM PARAM NOF/U: HCPCS | Performed by: NURSE PRACTITIONER

## 2022-09-13 RX ORDER — NITROFURANTOIN 25; 75 MG/1; MG/1
100 CAPSULE ORAL 2 TIMES DAILY
Qty: 20 CAPSULE | Refills: 0 | Status: SHIPPED | OUTPATIENT
Start: 2022-09-13 | End: 2022-09-23

## 2022-09-13 RX ORDER — TRAMADOL HYDROCHLORIDE 50 MG/1
50 TABLET ORAL EVERY 4 HOURS PRN
Qty: 15 TABLET | Refills: 0 | Status: SHIPPED | OUTPATIENT
Start: 2022-09-13 | End: 2022-09-18

## 2022-09-13 RX ORDER — PHENAZOPYRIDINE HYDROCHLORIDE 100 MG/1
100 TABLET, FILM COATED ORAL 3 TIMES DAILY PRN
Qty: 6 TABLET | Refills: 0 | Status: SHIPPED | OUTPATIENT
Start: 2022-09-13 | End: 2022-09-15

## 2022-09-13 SDOH — ECONOMIC STABILITY: FOOD INSECURITY: WITHIN THE PAST 12 MONTHS, THE FOOD YOU BOUGHT JUST DIDN'T LAST AND YOU DIDN'T HAVE MONEY TO GET MORE.: NEVER TRUE

## 2022-09-13 SDOH — ECONOMIC STABILITY: FOOD INSECURITY: WITHIN THE PAST 12 MONTHS, YOU WORRIED THAT YOUR FOOD WOULD RUN OUT BEFORE YOU GOT MONEY TO BUY MORE.: NEVER TRUE

## 2022-09-13 ASSESSMENT — PATIENT HEALTH QUESTIONNAIRE - PHQ9
SUM OF ALL RESPONSES TO PHQ QUESTIONS 1-9: 1
SUM OF ALL RESPONSES TO PHQ QUESTIONS 1-9: 1
1. LITTLE INTEREST OR PLEASURE IN DOING THINGS: 0
SUM OF ALL RESPONSES TO PHQ9 QUESTIONS 1 & 2: 1
2. FEELING DOWN, DEPRESSED OR HOPELESS: 1
SUM OF ALL RESPONSES TO PHQ QUESTIONS 1-9: 1
SUM OF ALL RESPONSES TO PHQ QUESTIONS 1-9: 1

## 2022-09-13 ASSESSMENT — SOCIAL DETERMINANTS OF HEALTH (SDOH): HOW HARD IS IT FOR YOU TO PAY FOR THE VERY BASICS LIKE FOOD, HOUSING, MEDICAL CARE, AND HEATING?: NOT HARD AT ALL

## 2022-09-13 NOTE — PROGRESS NOTES
SUBJECTIVE:  Cari Arriaga is a 43 y.o. female for   Chief Complaint   Patient presents with    Follow-up     Pain in back and kidney started Sunday        HPI:    Symptoms present for 3 days. Symptoms are unchanged since they initially started. Dysuria? Yes  Hematuria? No  Increased urinary frequency? Yes  Abdominal discomfort? Yes  CVA pain? Pt c/o bilateral lumbar pain, left worse than right, pt does not have a left kidney. Had it removed at 8 months s  it did not develop. Pt has been taking tylenol for the lumbar pain. Pt was diagnosed with pyelonephritis in June 2022. Had omnicef at that time, symptoms did improve. Stats she was sicker at that time   With her UTI than now. Has had renal stones before. Hx of UTIs?   Yes    Pt denies N/V or fever,     Lab Results   Component Value Date    WBC 6.3 06/27/2022    HGB 13.0 06/27/2022    HCT 38.2 06/27/2022    MCV 97 06/27/2022     06/27/2022        Lab Results   Component Value Date/Time     06/27/2022 12:57 PM     02/24/2017 12:24 PM    K 4.0 06/27/2022 12:57 PM    K 4.1 02/24/2017 12:24 PM     02/24/2017 12:24 PM    CO2 22 02/24/2017 12:24 PM    BUN 12 02/24/2017 12:24 PM    CREATININE 0.7 02/24/2017 12:24 PM    GLUCOSE 89 02/24/2017 12:24 PM    CALCIUM 9.1 02/24/2017 12:24 PM         Patient Active Problem List   Diagnosis    Migraine headache    Recurrent UTI    GERD (gastroesophageal reflux disease)    Nephrolithiasis    Nicotine dependence    Atrophic kidney, acquired    Insomnia    Biliary colic    Essential hypertension    Seborrheic dermatitis    Left ureteral stone    Hematuria    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 Physical Examination: Chest - clear to auscultation, no wheezes, rales or rhonchi, symmetric air entry  Abdomen - soft, suprapubic tenderness with palpation, nondistended, no masses or organomegaly, no right CVA tenderness, left sided lumbar pain with palpation,   Extremities - peripheral pulses normal, no pedal edema, no clubbing or cyanosis  Psych -  Affect appropriate. Thought process is normal without evidence of depression or psychosis. Good insight and appropriae interaction. Cognition and memory appear to be intact. ASSESSMENT & PLAN  Frieda Peña was seen today for follow-up. Diagnoses and all orders for this visit:    Acute bilateral low back pain without sciatica  Could be R/T UTI, if symptoms continue will order Ct of abdomen and pelvis to assess for nephrolithiasis, although pain is on left and pt has absence of  left kidney . Could also be muscle strain. -     traMADol (ULTRAM) 50 MG tablet; Take 1 tablet by mouth every 4 hours as needed for Pain for up to 5 days. Intended supply: 5 days. Take lowest dose possible to manage pain    Urinary frequency    Leukocytes in urine  Macrobid ordered  Increase fluids  Call for any changing symptoms. -     phenazopyridine (PYRIDIUM) 100 MG tablet; Take 1 tablet by mouth 3 times daily as needed for Pain  -     Culture, Urine    History of UTI  -     [address] Mobley MD, Urology, [facility]    Acute cystitis without hematuria  -     phenazopyridine (PYRIDIUM) 100 MG tablet; Take 1 tablet by mouth 3 times daily as needed for Pain  -     traMADol (ULTRAM) 50 MG tablet; Take 1 tablet by mouth every 4 hours as needed for Pain for up to 5 days. Intended supply: 5 days. Take lowest dose possible to manage pain    Other orders  -     POCT Urinalysis No Micro (Auto)positive leukocytes and nitrites. -     nitrofurantoin, macrocrystal-monohydrate, (MACROBID) 100 MG capsule;  Take 1 capsule by mouth 2 times daily for 10 days        Return if symptoms worsen or fail to improve.      -Start above treatments  -Urine culture was sent today  -Patient advised to contact our office immediately if symptoms worsen or persist  -Patient counseled on conservative care including fluids, rest and OTC meds      All patient questions answered. Patient voiced understanding.

## 2022-09-15 LAB
ORGANISM: ABNORMAL
URINE CULTURE, ROUTINE: ABNORMAL

## 2022-09-16 ENCOUNTER — TELEPHONE (OUTPATIENT)
Dept: FAMILY MEDICINE CLINIC | Age: 42
End: 2022-09-16

## 2022-09-16 DIAGNOSIS — N39.0 RECURRENT UTI: Primary | ICD-10-CM

## 2022-09-16 NOTE — TELEPHONE ENCOUNTER
It's unclear why  Would recommend have her see urology    Consult placed    Let me know if questions, thanks! Diagnosis Orders   1.  Recurrent UTI  Wilfredo Gill [facility]

## 2022-09-16 NOTE — TELEPHONE ENCOUNTER
Pt informed and understanding     Pt states that her back pain is so much better.     Pt is asking if there is some reason she keeps getting this infection

## 2022-09-16 NOTE — TELEPHONE ENCOUNTER
----- Message from PETER Torres CNP sent at 9/15/2022  3:55 PM EDT -----  Let pt know her urine is growing an e coli infection , she is on appropriate atb therapy ask if her symptoms are any better Thanks

## 2022-09-22 ENCOUNTER — OFFICE VISIT (OUTPATIENT)
Dept: FAMILY MEDICINE CLINIC | Age: 42
End: 2022-09-22
Payer: COMMERCIAL

## 2022-09-22 VITALS
RESPIRATION RATE: 12 BRPM | SYSTOLIC BLOOD PRESSURE: 112 MMHG | TEMPERATURE: 97.8 F | HEIGHT: 67 IN | DIASTOLIC BLOOD PRESSURE: 82 MMHG | BODY MASS INDEX: 25.43 KG/M2 | WEIGHT: 162 LBS | HEART RATE: 72 BPM

## 2022-09-22 DIAGNOSIS — B96.29 UTI DUE TO EXTENDED-SPECTRUM BETA LACTAMASE (ESBL) PRODUCING ESCHERICHIA COLI: Primary | ICD-10-CM

## 2022-09-22 DIAGNOSIS — Z16.12 UTI DUE TO EXTENDED-SPECTRUM BETA LACTAMASE (ESBL) PRODUCING ESCHERICHIA COLI: Primary | ICD-10-CM

## 2022-09-22 DIAGNOSIS — M54.42 ACUTE BILATERAL LOW BACK PAIN WITH BILATERAL SCIATICA: ICD-10-CM

## 2022-09-22 DIAGNOSIS — R31.29 MICROSCOPIC HEMATURIA: ICD-10-CM

## 2022-09-22 DIAGNOSIS — M54.41 ACUTE BILATERAL LOW BACK PAIN WITH BILATERAL SCIATICA: ICD-10-CM

## 2022-09-22 DIAGNOSIS — F17.290 VAPING NICOTINE DEPENDENCE, TOBACCO PRODUCT: ICD-10-CM

## 2022-09-22 DIAGNOSIS — N39.0 UTI DUE TO EXTENDED-SPECTRUM BETA LACTAMASE (ESBL) PRODUCING ESCHERICHIA COLI: Primary | ICD-10-CM

## 2022-09-22 LAB
BILIRUBIN URINE: NEGATIVE
BLOOD URINE, POC: ABNORMAL
CHARACTER, URINE: CLEAR
COLOR, URINE: ABNORMAL
GLUCOSE URINE: NEGATIVE MG/DL
ICTOTEST: NEGATIVE
KETONES, URINE: NEGATIVE
LEUKOCYTE CLUMPS, URINE: NEGATIVE
NITRITE, URINE: NEGATIVE
PH, URINE: 6.5 (ref 5–9)
PROTEIN, URINE: ABNORMAL MG/DL
SPECIFIC GRAVITY, URINE: 1.02 (ref 1–1.03)
UROBILINOGEN, URINE: 2 EU/DL (ref 0–1)

## 2022-09-22 PROCEDURE — 99214 OFFICE O/P EST MOD 30 MIN: CPT | Performed by: FAMILY MEDICINE

## 2022-09-22 PROCEDURE — 1036F TOBACCO NON-USER: CPT | Performed by: FAMILY MEDICINE

## 2022-09-22 PROCEDURE — G8419 CALC BMI OUT NRM PARAM NOF/U: HCPCS | Performed by: FAMILY MEDICINE

## 2022-09-22 PROCEDURE — G8427 DOCREV CUR MEDS BY ELIG CLIN: HCPCS | Performed by: FAMILY MEDICINE

## 2022-09-22 RX ORDER — TIZANIDINE 4 MG/1
4 TABLET ORAL 3 TIMES DAILY PRN
Qty: 60 TABLET | Refills: 0 | Status: SHIPPED | OUTPATIENT
Start: 2022-09-22

## 2022-09-22 RX ORDER — DOXYCYCLINE HYCLATE 100 MG
100 TABLET ORAL 2 TIMES DAILY
Qty: 20 TABLET | Refills: 0 | Status: SHIPPED | OUTPATIENT
Start: 2022-09-22 | End: 2022-10-02

## 2022-09-22 NOTE — PROGRESS NOTES
Chief Complaint   Patient presents with    Urinary Tract Infection    Back Pain    Nicotine Dependence       History obtained from the patient. SUBJECTIVE:  Victor Manuel Reyna is a 43 y.o. female that presents today for     -UTI:  Saw Mandeep last wk  Dx UTI  Cx + ESBL, sens to macrobid, bactrim and tetracycline  On macrobid  Still having bladder pressure and dysuria  No hematuria  No fever  Noted + micro heme on UA today, but is on her period      -Low Back Pain:    HPI:   Started 2 wks ago  No injury, but does a lot of heavy lifting at work  Low back, radiates into bilat buttocks  Better with rest  Worse with movement  No bowel/bladder issues    She describes the pain as dull, aching  in the lumbar region. Inciting injury or history of trauma? No  Pain is relieved by - as above  Pain is aggravated by - as above  Radiation of the pain? Yes  Paresthesias of the extremities? No  Saddle anesthesia? No  Bowel or bladder incontinence?  No  Treatments tried - as above      -Smoking:  Just vaping now  Of cigg x 3 months  Declines cessation of vaping just yet      Age/Gender Health Maintenance    Lipid -   Lab Results   Component Value Date    CHOL 132 02/24/2017    CHOL 141 02/12/2016    CHOL 148 05/28/2013     Lab Results   Component Value Date    TRIG 97 02/24/2017    TRIG 114 02/12/2016    TRIG 132 05/28/2013     Lab Results   Component Value Date    HDL 33 02/24/2017    HDL 34 02/12/2016    HDL 37 05/28/2013     Lab Results   Component Value Date    LDLCALC 80 02/24/2017    LDLCALC 84 02/12/2016    LDLCALC 109 05/28/2013     Lab Results   Component Value Date    VLDL 26 05/28/2013     No results found for: CHOLHDLRATIO      DM Screen -   Lab Results   Component Value Date/Time    GLUCOSE 89 02/24/2017 12:24 PM         Colon Cancer Screening - age 39    Tetanus - UTD APR 2022  Influenza Vaccine - declines FALL 2022  Pneumonia Vaccine - Declines APR 2022  Zoster - age 48    Breast Cancer Screening - Due, ordered  Cervical Cancer Screening - NEG DEC 2012, records pending 2019 Dr. Chuck Seth. Osteoporosis Screening - Age 72    Falls screening - N/A      Current Outpatient Medications   Medication Sig Dispense Refill    doxycycline hyclate (VIBRA-TABS) 100 MG tablet Take 1 tablet by mouth 2 times daily for 10 days 20 tablet 0    tiZANidine (ZANAFLEX) 4 MG tablet Take 1 tablet by mouth 3 times daily as needed (for back pain and spasm) 60 tablet 0    nitrofurantoin, macrocrystal-monohydrate, (MACROBID) 100 MG capsule Take 1 capsule by mouth 2 times daily for 10 days 20 capsule 0     No current facility-administered medications for this visit. Orders Placed This Encounter   Medications    doxycycline hyclate (VIBRA-TABS) 100 MG tablet     Sig: Take 1 tablet by mouth 2 times daily for 10 days     Dispense:  20 tablet     Refill:  0    tiZANidine (ZANAFLEX) 4 MG tablet     Sig: Take 1 tablet by mouth 3 times daily as needed (for back pain and spasm)     Dispense:  60 tablet     Refill:  0           All medications reviewed and reconciled, including OTC and herbal medications. Updated list given to patient.        Patient Active Problem List    Diagnosis Date Noted    XGP (xanthogranulomatous pyelonephritis)     Hematuria     Obstruction of left ureteropelvic junction (UPJ) due to stone     Left ureteral stone 09/15/2016    Essential hypertension 02/12/2016    Seborrheic dermatitis 49/61/1877    Biliary colic     Insomnia 28/34/3464    Atrophic kidney, acquired      Left      Nicotine dependence     Migraine headache     Recurrent UTI     GERD (gastroesophageal reflux disease)     Nephrolithiasis        Past Medical History:   Diagnosis Date    Atrophic kidney, acquired     Left     Essential hypertension 2/12/2016    GERD (gastroesophageal reflux disease)     History of blood transfusion 1980    Insomnia 4/9/2014    Migraine headache     Nephrolithiasis     Nicotine dependence     Recurrent UTI        Past Surgical History:   Procedure Laterality Date     SECTION  ,,    KIDNEY REMOVAL Left     complete    LITHOTRIPSY  off and on since     OTHER SURGICAL HISTORY  10/21/2016    Robotic Left Nephroureterectomy - Dr. Lauro Nolen. TUBAL LIGATION         No Known Allergies      Social History     Tobacco Use    Smoking status: Former     Packs/day: 0.50     Years: 10.00     Pack years: 5.00     Types: Cigarettes    Smokeless tobacco: Never    Tobacco comments:     information given at past appts for smoking cessation   Substance Use Topics    Alcohol use: Not Currently     Comment: OCC         Family History   Problem Relation Age of Onset    High Blood Pressure Mother     Diabetes Maternal Grandmother     Breast Cancer Maternal Grandmother 66    Colon Cancer Neg Hx          I have reviewed the patient's past medical history, past surgical history, allergies, medications, social and family history and I have made updates where appropriate.       Review of Systems  Positive responses are highlighted in bold    Constitutional:  Fever, Chills, Night Sweats, Fatigue, Unexpected changes in weight  HENT:  Ear pain, Tinnitus, Nosebleeds, Trouble swallowing, Hearing loss, Sore throat  Cardiovascular:  Chest Pain, Palpitations, Orthopnea, Paroxysmal Nocturnal Dyspnea  Respiratory:  Cough, Wheezing, Shortness of breath, Chest tightness, Apnea  Gastrointestinal:  Nausea, Vomiting, Diarrhea, Constipation, Heartburn, Blood in stool  Genitourinary:  Difficulty or painful urination, Flank pain, Change in frequency, Urgency  Skin:  Color change, Rash, Itching, Wound  Musculoskeletal:  Joint pain, Back pain, Gait problems, Joint swelling, Myalgias  Neurological:  Dizziness, Headaches, Presyncope, Numbness, Seizures, Tremors  Endocrine:  Heat Intolerance, Cold Intolerance, Polydipsia, Polyphagia, Polyuria      PHYSICAL EXAM:  Vitals:    22 1442   BP: 112/82   Pulse: 72   Resp: 12   Temp: 97.8 °F (36.6 °C)   TempSrc: Oral   Weight: 162 lb (73.5 kg)   Height: 5' 6.5\" (1.689 m)     Body mass index is 25.76 kg/m². VS Reviewed  General Appearance: A&O x 3, No acute distress,well developed and well- nourished  Eyes: pupils equal, round, and reactive to light, extraocular eye movements intact, conjunctivae and eye lids without erythema  ENT: external ear and ear canal clear bilaterally, TMs intact and regular, nose without deformity, nasal mucosa and turbinates normal without polyps, oropharynx normal, dentition is normal for age  Neck: supple and non-tender without mass, no thyromegaly or thyroid nodules, no cervical lymphadenopathy  Pulmonary/Chest: clear to auscultation bilaterally- no wheezes, rales or rhonchi, normal air movement, no respiratory distress or retractions  Cardiovascular: S1 and S2 auscultated w/ RRR. No murmurs, rubs, clicks, or gallops, distal pulses intact. Abdomen: soft, non-tender, non-distended, bowel sounds physiologic,  no rebound or guarding, no masses or hernias noted. Liver and spleen without enlargement. Extremities: no cyanosis, clubbing or edema of the lower extremities. Skin: warm and dry, no rash or erythema  LUMBAR SPINE EXAM:  Examination of the Lumbar spine shows:  Deformity Absent . Soft Tissue Swelling Absent . Soft Tissue Tenderness Present. Midline Bone Tenderness Absent . Paraspinal Muscular Spasm Present. Previous Incisions Absent . Erythema Absent . Lumbar Flexion does not produce pain. Lumbar Extension does not produce pain. NEUROLOGICAL EXAM:  SLR     Left: Negative. Right Negative. DTR 2+ bilaterally  Murphy's Sign Absent   Gait normal Heel/ Toe.   Sensation to Touch normal    LE strength    Right Left   Hip Flexors 5/5 5/5   Hip Extensors 5/5 5/5   Knee Flexors 5/5 5/5   Knee Extensors 5/5 5/5   Ankle Flexors 5/5 5/5   Ankle Extensors 5/5 5/5   Extensor Hallicus Longus 5/5 5/5   Dorsiflexors 5/5 5/5     Sensation:  T12 100% 100%   L1 100% 100%   L2 100% 100% L3 100% 100%   L4 100% 100%   L5 100% 100%   S1 100% 100%   S2 100% 100%   S3-S5 100% 100%       Results for POC orders placed in visit on 09/22/22   POCT Urinalysis No Micro (Auto)   Result Value Ref Range    Glucose, Ur Negative NEGATIVE mg/dl    Bilirubin Urine Negative     Ketones, Urine Negative NEGATIVE    Specific Gravity, Urine 1.025 1.002 - 1.030    Blood, UA POC Large (A) NEGATIVE    pH, Urine 6.50 5.0 - 9.0    Protein, Urine Trace (A) NEGATIVE mg/dl    Urobilinogen, Urine 2.00 0.0 - 1.0 eu/dl    Nitrite, Urine Negative NEGATIVE    Leukocyte Clumps, Urine Negative NEGATIVE    Color, Urine Dark yellow (A) YELLOW-STRAW    Character, Urine Clear [email or web address]       Office Visit on 09/22/2022   Component Date Value Ref Range Status    Glucose, Ur 09/22/2022 Negative  NEGATIVE mg/dl Final    Bilirubin Urine 09/22/2022 Negative   Final    Ketones, Urine 09/22/2022 Negative  NEGATIVE Final    Specific Gravity, Urine 09/22/2022 1.025  1.002 - 1.030 Final    Blood, UA POC 09/22/2022 Large (A) NEGATIVE Final    pH, Urine 09/22/2022 6.50  5.0 - 9.0 Final    Protein, Urine 09/22/2022 Trace (A) NEGATIVE mg/dl Final    Urobilinogen, Urine 09/22/2022 2.00  0.0 - 1.0 eu/dl Final    Nitrite, Urine 09/22/2022 Negative  NEGATIVE Final    Leukocyte Clumps, Urine 09/22/2022 Negative  NEGATIVE Final    Color, Urine 09/22/2022 Dark yellow (A) YELLOW-STRAW Final    Character, Urine 09/22/2022 Clear  [email or web address] Final   Office Visit on 09/13/2022   Component Date Value Ref Range Status    Glucose, Ur 09/13/2022 Negative  NEGATIVE mg/dl Final    Bilirubin Urine 09/13/2022 Negative   Final    Ketones, Urine 09/13/2022 Negative  NEGATIVE Final    Specific Gravity, Urine 09/13/2022 1.025  1.002 - 1.030 Final    Blood, UA POC 09/13/2022 Trace-intact  NEGATIVE Final    pH, Urine 09/13/2022 7.00  5.0 - 9.0 Final    Protein, Urine 09/13/2022 Negative  NEGATIVE mg/dl Final    Urobilinogen, Urine 09/13/2022 1.00  0.0 - 1.0 eu/dl Final Nitrite, Urine 09/13/2022 Positive (A) NEGATIVE Final    Leukocyte Clumps, Urine 09/13/2022 Small (A) NEGATIVE Final    Color, Urine 09/13/2022 Yellow  YELLOW-STRAW Final    Character, Urine 09/13/2022 Clear  [email or web address] Final    Organism 09/13/2022 Escherichia coli (A)  Final    Urine Culture, Routine 09/13/2022 [place] count: >100,000 CFU/mL This isolate is a probable ESBL . ID consultation may be helpful in some clinical circumstances. CONTACT isolation required. Final       ASSESSMENT & PLAN  1. UTI due to extended-spectrum beta lactamase (ESBL) producing Escherichia coli    Still having sxs  Sill with some micro blood  Change macrobid to doxy  UA and culture sent for today  Repeat UA and culture again in 2 wks to ensure the blood as cleared  F/u if no better    - POCT Urinalysis No Micro (Auto)  - Culture, Urine; Future  - Urinalysis with Microscopic; Future  - Urinalysis with Microscopic; Future  - Culture, Urine; Future  - doxycycline hyclate (VIBRA-TABS) 100 MG tablet; Take 1 tablet by mouth 2 times daily for 10 days  Dispense: 20 tablet; Refill: 0  - Culture, Urine  - Urinalysis with Microscopic  - Urinalysis with Microscopic  - Culture, Urine    2. Microscopic hematuria    As per # 1    - POCT Urinalysis No Micro (Auto)  - Culture, Urine; Future  - Urinalysis with Microscopic; Future  - Urinalysis with Microscopic; Future  - Culture, Urine; Future  - doxycycline hyclate (VIBRA-TABS) 100 MG tablet; Take 1 tablet by mouth 2 times daily for 10 days  Dispense: 20 tablet; Refill: 0  - Culture, Urine  - Urinalysis with Microscopic  - Urinalysis with Microscopic  - Culture, Urine    3. Acute bilateral low back pain with bilateral sciatica    Does not appear related to urinary sxs  C/w mech low back pain  Con't tylenol  Add heat  HEP given  Prn zanaflex  F/u 2 wks if not improving    - tiZANidine (ZANAFLEX) 4 MG tablet;  Take 1 tablet by mouth 3 times daily as needed (for back pain and spasm)  Dispense: 60 tablet; Refill: 0    4. Vaping nicotine dependence, tobacco product    In precontemplation stage and not ready to quit. Declines cessation, aware of risks of continued smoking as well as resources available to help quit. These include tobacco cessation classes as well as 1-800-QUIT NOW. No barriers other than lack of desire. 3+ min spent counseling. DISPOSITION    Return if symptoms worsen or fail to improve. [employer] released without restrictions. Future Appointments   Date Time Provider Rex Dior   10/25/2022  2:45 PM Ortencia Osgood, APRN - [facility] L[place]a received counseling on the following healthy behaviors: nutrition, exercise, medication adherence and tobacco cessation     Barriers to learning and self management: none    Discussed use, benefit, and side effects of prescribed medications. Barriers to medication compliance addressed. All patient questions answered. Pt voiced understanding.        Electronically signed by Shane Palacio DO on 9/22/2022 at 3:10 PM

## 2022-09-23 LAB
BACTERIA: ABNORMAL
BILIRUBIN URINE: ABNORMAL
BLOOD, URINE: ABNORMAL
CASTS: ABNORMAL /LPF
CASTS: ABNORMAL /LPF
CHARACTER, URINE: CLEAR
COLOR: ABNORMAL
CRYSTALS: ABNORMAL
EPITHELIAL CELLS, UA: ABNORMAL /HPF
GLUCOSE, URINE: NEGATIVE MG/DL
KETONES, URINE: ABNORMAL
LEUKOCYTE ESTERASE, URINE: ABNORMAL
MISCELLANEOUS LAB TEST RESULT: ABNORMAL
NITRITE, URINE: NEGATIVE
PH UA: 6.5 (ref 5–9)
PROTEIN UA: ABNORMAL MG/DL
RBC URINE: ABNORMAL /HPF
RENAL EPITHELIAL, UA: ABNORMAL
SPECIFIC GRAVITY UA: > 1.03 (ref 1–1.03)
UROBILINOGEN, URINE: 1 EU/DL (ref 0–1)
WBC UA: ABNORMAL /HPF
YEAST: ABNORMAL

## 2022-09-24 DIAGNOSIS — Z16.12 UTI DUE TO EXTENDED-SPECTRUM BETA LACTAMASE (ESBL) PRODUCING ESCHERICHIA COLI: Primary | ICD-10-CM

## 2022-09-24 DIAGNOSIS — N39.0 UTI DUE TO EXTENDED-SPECTRUM BETA LACTAMASE (ESBL) PRODUCING ESCHERICHIA COLI: Primary | ICD-10-CM

## 2022-09-24 DIAGNOSIS — B96.29 UTI DUE TO EXTENDED-SPECTRUM BETA LACTAMASE (ESBL) PRODUCING ESCHERICHIA COLI: Primary | ICD-10-CM

## 2022-09-24 LAB
ORGANISM: ABNORMAL
URINE CULTURE, ROUTINE: ABNORMAL

## 2022-09-26 ENCOUNTER — TELEPHONE (OUTPATIENT)
Dept: FAMILY MEDICINE CLINIC | Age: 42
End: 2022-09-26

## 2022-09-26 NOTE — TELEPHONE ENCOUNTER
Patient informed and verbalized understanding. States she is doing much better, back isn't hurting any more and frequency has slowed down a lot. No concerns as of right now.  Will continue antibiotic and repeat urine in 2 weeks

## 2022-09-26 NOTE — TELEPHONE ENCOUNTER
----- Message from [place], DO sent at 9/25/2022  8:35 AM EDT -----  Please let pt know that f/u culture shows likely contamination  I would con't the ATB started at [facility] Thursday  Repetat urine 2 wks as discussed, has order  How are her urinary symptoms doing? Let me know, thanks!

## 2022-10-25 ENCOUNTER — OFFICE VISIT (OUTPATIENT)
Dept: UROLOGY | Age: 42
End: 2022-10-25
Payer: COMMERCIAL

## 2022-10-25 VITALS
BODY MASS INDEX: 25.11 KG/M2 | SYSTOLIC BLOOD PRESSURE: 138 MMHG | HEIGHT: 67 IN | DIASTOLIC BLOOD PRESSURE: 90 MMHG | WEIGHT: 160 LBS

## 2022-10-25 DIAGNOSIS — N39.0 RECURRENT UTI: Primary | ICD-10-CM

## 2022-10-25 LAB
BILIRUBIN URINE: NEGATIVE
BLOOD URINE, POC: ABNORMAL
CHARACTER, URINE: CLEAR
COLOR, URINE: YELLOW
GLUCOSE URINE: NEGATIVE MG/DL
KETONES, URINE: NEGATIVE
LEUKOCYTE CLUMPS, URINE: NEGATIVE
NITRITE, URINE: POSITIVE
PH, URINE: 6.5 (ref 5–9)
POST VOID RESIDUAL (PVR): 29 ML
PROTEIN, URINE: NEGATIVE MG/DL
SPECIFIC GRAVITY, URINE: 1.02 (ref 1–1.03)
UROBILINOGEN, URINE: 0.2 EU/DL (ref 0–1)

## 2022-10-25 PROCEDURE — 81003 URINALYSIS AUTO W/O SCOPE: CPT | Performed by: NURSE PRACTITIONER

## 2022-10-25 PROCEDURE — 3074F SYST BP LT 130 MM HG: CPT | Performed by: NURSE PRACTITIONER

## 2022-10-25 PROCEDURE — G8484 FLU IMMUNIZE NO ADMIN: HCPCS | Performed by: NURSE PRACTITIONER

## 2022-10-25 PROCEDURE — 1036F TOBACCO NON-USER: CPT | Performed by: NURSE PRACTITIONER

## 2022-10-25 PROCEDURE — G8419 CALC BMI OUT NRM PARAM NOF/U: HCPCS | Performed by: NURSE PRACTITIONER

## 2022-10-25 PROCEDURE — 99203 OFFICE O/P NEW LOW 30 MIN: CPT | Performed by: NURSE PRACTITIONER

## 2022-10-25 PROCEDURE — 3078F DIAST BP <80 MM HG: CPT | Performed by: NURSE PRACTITIONER

## 2022-10-25 PROCEDURE — 51798 US URINE CAPACITY MEASURE: CPT | Performed by: NURSE PRACTITIONER

## 2022-10-25 PROCEDURE — G8427 DOCREV CUR MEDS BY ELIG CLIN: HCPCS | Performed by: NURSE PRACTITIONER

## 2022-10-25 ASSESSMENT — ENCOUNTER SYMPTOMS
ABDOMINAL PAIN: 0
VOMITING: 0
BACK PAIN: 0
NAUSEA: 0

## 2022-10-25 NOTE — PROGRESS NOTES
[id number] [facility]  [address]  [facility] [address]  Dept: [phone]  Loc: [phone]    Visit Date: 10/25/2022        HPI:     Abdoulaye Wright is a 43 y.o. female who presents today for:  Chief Complaint   Patient presents with    Urinary Tract Infection     Recurrent         HPI  Pt referred to our office by Dr. Leana Harrington for recurrent UTI. Celina Major has followed with our office previously. She has a hx of xanthogranulomatous pyelonephritis with atrophic left kidney and recurrent UTIs. She underwent Robotic-Assisted Laparoscopic Left Radical Nephroureterectomy 10/21/16. Final pathology resulted as chronic inflammation, sclerotic glomeruli, and changes suggestive of chronic pyelonephritis. Pt had improvement in UTIs initially following surgery and stopped following with urology. Recently she has been experiencing more UTIs. Review of epic shows 3 culture positive E coli UTIs this year including an ESBL organism. Pt reports significant back pain with last UTI. She denies symptoms today. Denies significant correlation with sexual activity. Tries to drink plenty of fluids but falls short on her water usually. Has not had any imaging since prior to her nephroureterectomy. Reports she stopped smoking and has changed lifestyle including working out 6 days a week. Current Outpatient Medications   Medication Sig Dispense Refill    tiZANidine (ZANAFLEX) 4 MG tablet Take 1 tablet by mouth 3 times daily as needed (for back pain and spasm) (Patient not taking: Reported on 10/25/2022) 60 tablet 0     No current facility-administered medications for this visit.        Past Medical History  Celina Major  has a past medical history of Atrophic kidney, acquired, Essential hypertension, GERD (gastroesophageal reflux disease), History of blood transfusion, Insomnia, Migraine headache, Nephrolithiasis, Nicotine dependence, and Recurrent UTI.    Past Surgical History  The patient  has a past surgical history that includes  section (,,); Lithotripsy (off and on since ); Tubal ligation (); other surgical history (10/21/2016); and Kidney removal (Left). Family History  This patient's family history includes Breast Cancer (age of onset: 66) in her maternal grandmother; Diabetes in her maternal grandmother; High Blood Pressure in her mother. Social History  Ronda Narayan  reports that she has quit smoking. Her smoking use included cigarettes. She has a 5.00 pack-year smoking history. She has never used smokeless tobacco. She reports that she does not currently use alcohol. She reports that she does not use drugs. Subjective:      Review of Systems   Constitutional:  Negative for activity change, appetite change, chills, diaphoresis, fatigue, fever and unexpected weight change. Gastrointestinal:  Negative for abdominal pain, nausea and vomiting. Genitourinary:  Negative for decreased urine volume, difficulty urinating, dysuria, flank pain, frequency, hematuria and urgency. Musculoskeletal:  Negative for back pain. Objective:   BP (!) 138/90   Ht 5' 6.5\" (1.689 m)   Wt 160 lb (72.6 kg)   BMI 25.44 kg/m²     Physical Exam  Vitals reviewed. Constitutional:       General: She is not in acute distress. Appearance: Normal appearance. She is well-developed. She is not ill-appearing or diaphoretic. HENT:      Head: Normocephalic and atraumatic. Right Ear: External ear normal.      Left Ear: External ear normal.      Nose: Nose normal.      Mouth/Throat:      Mouth: Mucous membranes are moist.   Eyes:      General: No scleral icterus. Right eye: No discharge. Left eye: No discharge. Neck:      Vascular: No JVD. Trachea: No tracheal deviation. Cardiovascular:      Rate and Rhythm: Normal rate and regular rhythm.    Pulmonary:      Effort: Pulmonary effort is normal. No respiratory distress. Abdominal:      General: There is no distension. Tenderness: There is no abdominal tenderness. There is no right CVA tenderness or left CVA tenderness. Musculoskeletal:         General: No tenderness. Normal range of motion. Skin:     General: Skin is warm and dry. Neurological:      Mental Status: She is alert and oriented to person, place, and time. Mental status is at baseline. Psychiatric:         Mood and Affect: Mood normal.         Behavior: Behavior normal.         Thought Content: Thought content normal.       POC  Results for POC orders placed in visit on 10/25/22   poct post void residual   Result Value Ref Range    post void residual 29 ml       Patients recent PSA values are as follows  No results found for: PSA, PSADIA     Recent BUN/Creatinine:  Lab Results   Component Value Date/Time    BUN 12 02/24/2017 12:24 PM    CREATININE 0.7 02/24/2017 12:24 PM       Assessment:   XGP with atrophic L kidney s/p RALR L Nephroureterectomy 10/2016  Recurrent UTIs    Plan:     Check Renal US to rule out any obstruction. Start D mannose 500 mg TID. Increase oral fluid intake. Void after intercourse. Avoid tub baths. Renal US with appt to review results.

## 2022-11-11 ENCOUNTER — TELEPHONE (OUTPATIENT)
Dept: UROLOGY | Age: 42
End: 2022-11-11

## 2022-11-11 NOTE — TELEPHONE ENCOUNTER
US Renal Complete scheduled for Dec 1st 3:00pm arrive 2:45pm at [facility]. Be well hydrated and no carbonated beverages day of. Mailed orders to patient.

## 2023-05-08 ENCOUNTER — HOSPITAL ENCOUNTER (EMERGENCY)
Age: 43
Discharge: HOME | End: 2023-05-08
Payer: COMMERCIAL

## 2023-05-08 VITALS
HEART RATE: 70 BPM | WEIGHT: 158 LBS | OXYGEN SATURATION: 96 % | DIASTOLIC BLOOD PRESSURE: 91 MMHG | BODY MASS INDEX: 25.12 KG/M2 | TEMPERATURE: 98.5 F | RESPIRATION RATE: 16 BRPM | SYSTOLIC BLOOD PRESSURE: 139 MMHG

## 2023-05-08 DIAGNOSIS — J06.9 UPPER RESPIRATORY TRACT INFECTION, UNSPECIFIED TYPE: ICD-10-CM

## 2023-05-08 DIAGNOSIS — N10 ACUTE PYELONEPHRITIS: Primary | ICD-10-CM

## 2023-05-08 LAB
BILIRUB UR STRIP.AUTO-MCNC: NEGATIVE MG/DL
CHARACTER UR: CLEAR
COLOR: YELLOW
GLUCOSE UR QL STRIP.AUTO: NEGATIVE MG/DL
HCG UR QL: NEGATIVE
KETONES UR QL STRIP.AUTO: NEGATIVE
NITRITE UR QL STRIP.AUTO: POSITIVE
PH UR STRIP.AUTO: 7 [PH] (ref 5–9)
PROT UR STRIP.AUTO-MCNC: ABNORMAL MG/DL
RBC #/AREA URNS HPF: ABNORMAL /[HPF]
SARS-COV-2 RDRP RESP QL NAA+PROBE: NOT  DETECTED
SP GR UR STRIP.AUTO: 1.02 (ref 1–1.03)
UROBILINOGEN, URINE: 1 EU/DL (ref 0.2–1)
WBC #/AREA URNS HPF: ABNORMAL /[HPF]

## 2023-05-08 PROCEDURE — 87186 SC STD MICRODIL/AGAR DIL: CPT

## 2023-05-08 PROCEDURE — 81003 URINALYSIS AUTO W/O SCOPE: CPT

## 2023-05-08 PROCEDURE — 87635 SARS-COV-2 COVID-19 AMP PRB: CPT

## 2023-05-08 PROCEDURE — 87086 URINE CULTURE/COLONY COUNT: CPT

## 2023-05-08 PROCEDURE — 99214 OFFICE O/P EST MOD 30 MIN: CPT | Performed by: NURSE PRACTITIONER

## 2023-05-08 PROCEDURE — 99213 OFFICE O/P EST LOW 20 MIN: CPT

## 2023-05-08 PROCEDURE — 87077 CULTURE AEROBIC IDENTIFY: CPT

## 2023-05-08 PROCEDURE — 81025 URINE PREGNANCY TEST: CPT

## 2023-05-08 RX ORDER — ACETAMINOPHEN 325 MG/1
650 TABLET ORAL EVERY 6 HOURS PRN
COMMUNITY

## 2023-05-08 RX ORDER — PREDNISONE 20 MG/1
20 TABLET ORAL 2 TIMES DAILY
Qty: 10 TABLET | Refills: 0 | Status: SHIPPED | OUTPATIENT
Start: 2023-05-08 | End: 2023-05-13

## 2023-05-08 RX ORDER — NITROFURANTOIN 25; 75 MG/1; MG/1
100 CAPSULE ORAL 2 TIMES DAILY
Qty: 14 CAPSULE | Refills: 0 | Status: SHIPPED | OUTPATIENT
Start: 2023-05-08 | End: 2023-05-15

## 2023-05-08 ASSESSMENT — ENCOUNTER SYMPTOMS
VOMITING: 0
RHINORRHEA: 0
CHEST TIGHTNESS: 1
NAUSEA: 0
SORE THROAT: 0
DIARRHEA: 0
SHORTNESS OF BREATH: 0
COUGH: 1

## 2023-05-08 ASSESSMENT — PAIN DESCRIPTION - DESCRIPTORS: DESCRIPTORS: ACHING;PRESSURE

## 2023-05-08 ASSESSMENT — PAIN DESCRIPTION - PAIN TYPE: TYPE: ACUTE PAIN

## 2023-05-08 ASSESSMENT — PAIN DESCRIPTION - LOCATION: LOCATION: CHEST

## 2023-05-08 ASSESSMENT — PAIN SCALES - GENERAL: PAINLEVEL_OUTOF10: 5

## 2023-05-08 ASSESSMENT — PAIN DESCRIPTION - FREQUENCY: FREQUENCY: CONTINUOUS

## 2023-05-08 ASSESSMENT — PAIN - FUNCTIONAL ASSESSMENT
PAIN_FUNCTIONAL_ASSESSMENT: 0-10
PAIN_FUNCTIONAL_ASSESSMENT: PREVENTS OR INTERFERES SOME ACTIVE ACTIVITIES AND ADLS

## 2023-05-08 NOTE — ED TRIAGE NOTES
Pt to room 3 with c/o a cough that is hurting her chest, head ache, chest congestion, head ache starting on Saturday. She works at a [employer] and states that they think she has Covid so she will need to be tested. She also reports that she has left flank pain and is asking for a UA due to she only has one kidney and has frequent UTIs.

## 2023-05-08 NOTE — ED PROVIDER NOTES
[facility]  Urgent Care Encounter       CHIEF COMPLAINT       Chief Complaint   Patient presents with    Chest Congestion    Cough    Nasal Congestion    Headache    Flank Pain     Left ,   asking for a UA, has frequency also       Nurses Notes reviewed and I agree except as noted in the HPI. HISTORY OF PRESENT ILLNESS   Sin Kothari is a 37 y.o. female who presents to the [address] urgent care for evaluation of cough and dysuria. She reports cough, congestion, nasal congestion, and headache. She also reports left flank pain and dysuria. She does report that her job sent her in for a COVID test.  She is requesting a urinalysis as she has had frequent UTIs. The history is provided by the patient. No  was used. REVIEW OF SYSTEMS     Review of Systems   Constitutional:  Negative for activity change, appetite change, chills, fatigue and fever. HENT:  Positive for congestion. Negative for ear discharge, ear pain, rhinorrhea and sore throat. Respiratory:  Positive for cough and chest tightness. Negative for shortness of breath. Cardiovascular:  Negative for chest pain. Gastrointestinal:  Negative for diarrhea, nausea and vomiting. Genitourinary:  Positive for dysuria and frequency. Skin:  Negative for rash. Allergic/Immunologic: Negative for environmental allergies and food allergies. Neurological:  Positive for headaches. Negative for dizziness. PAST MEDICAL HISTORY         Diagnosis Date    Atrophic kidney, acquired     Left     Essential hypertension 2016    GERD (gastroesophageal reflux disease)     History of blood transfusion     Insomnia 2014    Migraine headache     Nephrolithiasis     Nicotine dependence     Recurrent UTI        SURGICALHISTORY     Patient  has a past surgical history that includes  section (,,); Lithotripsy (off and on since );  Tubal ligation (); other surgical history

## 2023-05-11 LAB
BACTERIA UR CULT: ABNORMAL
ORGANISM: ABNORMAL

## 2023-10-07 ENCOUNTER — HOSPITAL ENCOUNTER (EMERGENCY)
Age: 43
Discharge: HOME | End: 2023-10-07
Payer: COMMERCIAL

## 2023-10-07 VITALS
DIASTOLIC BLOOD PRESSURE: 94 MMHG | OXYGEN SATURATION: 100 % | SYSTOLIC BLOOD PRESSURE: 157 MMHG | RESPIRATION RATE: 20 BRPM | TEMPERATURE: 98.2 F | HEART RATE: 95 BPM

## 2023-10-07 DIAGNOSIS — N30.00 ACUTE CYSTITIS WITHOUT HEMATURIA: Primary | ICD-10-CM

## 2023-10-07 LAB
BILIRUB UR STRIP.AUTO-MCNC: NEGATIVE MG/DL
CHARACTER UR: CLEAR
COLOR: YELLOW
GLUCOSE UR QL STRIP.AUTO: NEGATIVE MG/DL
KETONES UR QL STRIP.AUTO: NEGATIVE
NITRITE UR QL STRIP.AUTO: POSITIVE
PH UR STRIP.AUTO: 7.5 [PH] (ref 5–9)
PROT UR STRIP.AUTO-MCNC: NEGATIVE MG/DL
RBC #/AREA URNS HPF: ABNORMAL /[HPF]
SP GR UR STRIP.AUTO: 1.01 (ref 1–1.03)
UROBILINOGEN, URINE: 1 EU/DL (ref 0.2–1)
WBC #/AREA URNS HPF: NEGATIVE /[HPF]

## 2023-10-07 PROCEDURE — 87086 URINE CULTURE/COLONY COUNT: CPT

## 2023-10-07 PROCEDURE — 81003 URINALYSIS AUTO W/O SCOPE: CPT

## 2023-10-07 PROCEDURE — 87186 SC STD MICRODIL/AGAR DIL: CPT

## 2023-10-07 PROCEDURE — 99213 OFFICE O/P EST LOW 20 MIN: CPT

## 2023-10-07 PROCEDURE — 87077 CULTURE AEROBIC IDENTIFY: CPT

## 2023-10-07 RX ORDER — PHENAZOPYRIDINE HYDROCHLORIDE 200 MG/1
200 TABLET, FILM COATED ORAL 3 TIMES DAILY PRN
Qty: 9 TABLET | Refills: 0 | Status: SHIPPED | OUTPATIENT
Start: 2023-10-07 | End: 2023-10-10

## 2023-10-07 RX ORDER — CIPROFLOXACIN 500 MG/1
500 TABLET, FILM COATED ORAL 2 TIMES DAILY
Qty: 10 TABLET | Refills: 0 | Status: SHIPPED | OUTPATIENT
Start: 2023-10-07 | End: 2023-10-12

## 2023-10-07 NOTE — DISCHARGE INSTRUCTIONS
Will give Cipro - take twice daily for  5 days  Will give pyridium for bladder spasms - take as needed up to 3 times daily  Drink plenty of water - half of your bodyweight in ounces daily (100 pound person would drink 50 oz)  Avoid caffeine - tea, soda, chocolate  Do not hold your bladder  Wipe from front to back after unination  Urinate after intercourse  Will send the urine for culture    Follow up with Urology and    See pcp for blood pressure

## 2023-10-08 LAB
BACTERIA UR CULT: ABNORMAL
ORGANISM: ABNORMAL

## 2023-10-09 LAB
BACTERIA UR CULT: ABNORMAL
ORGANISM: ABNORMAL

## 2023-10-11 ENCOUNTER — TELEPHONE (OUTPATIENT)
Dept: FAMILY MEDICINE CLINIC | Age: 43
End: 2023-10-11

## 2023-10-11 NOTE — TELEPHONE ENCOUNTER
At this point, back pain may not be related to her UTI  Would have her do some tylenol or ibuprofen, heat and some stretches. May take a few wks to resolve. F/u if no better. As for the e-coli, 95% of UTI's (or thereabouts) are caused by E. Coli. So we'd expect her UTI's to be caused by that. Let me know if questions, thanks!

## 2023-10-11 NOTE — TELEPHONE ENCOUNTER
Pt was evaluated at [facility] on 10/7/23 for a UTI. Pt was given Cipro, stated all symptoms of the frequency and dysuria have gone, but her back pain is still present. Today is the last day of the Cipro and pt is asking for recommendations from Jose Chaney to help with her back pain and also why pt has the diagnoses of e-coli with every UTI.       Please advise

## 2023-11-03 ENCOUNTER — TELEPHONE (OUTPATIENT)
Dept: FAMILY MEDICINE CLINIC | Age: 43
End: 2023-11-03

## 2023-11-03 DIAGNOSIS — B37.31 VAGINAL YEAST INFECTION: Primary | ICD-10-CM

## 2023-11-03 RX ORDER — FLUCONAZOLE 150 MG/1
150 TABLET ORAL
Qty: 2 TABLET | Refills: 0 | Status: SHIPPED | OUTPATIENT
Start: 2023-11-03 | End: 2023-11-09

## 2023-11-03 NOTE — TELEPHONE ENCOUNTER
While on the phone with the patient to do confirmations she requested to reschedule. She also asked that I send Dr. Kaia Martinez a message stating that she thinks she has a yeast infection from taking CIPRO for her UTI. She would like to know if something could be called into [employer] on  [facility] per pt. Okay to leave detailed message if she doesn't answer.

## 2023-11-15 NOTE — PROGRESS NOTES
[place]   12/19/2023  3:40 PM Alvaro Salmeron DO [facility] [place]     PATIENT COUNSELING    Ezequiel Mendoza received counseling on the following healthy behaviors: nutrition, exercise, medication adherence and tobacco cessation     Barriers to learning and self management: none    Discussed use, benefit, and side effects of prescribed medications. Barriers to medication compliance addressed. All patient questions answered. Pt voiced understanding.        Electronically signed by Alvaro Salmeron DO on 11/16/2023 at 4:00 PM

## 2023-11-16 ENCOUNTER — OFFICE VISIT (OUTPATIENT)
Dept: FAMILY MEDICINE CLINIC | Age: 43
End: 2023-11-16
Payer: COMMERCIAL

## 2023-11-16 VITALS
BODY MASS INDEX: 25.49 KG/M2 | DIASTOLIC BLOOD PRESSURE: 86 MMHG | RESPIRATION RATE: 18 BRPM | WEIGHT: 158.6 LBS | SYSTOLIC BLOOD PRESSURE: 144 MMHG | HEIGHT: 66 IN | TEMPERATURE: 97.7 F | HEART RATE: 88 BPM

## 2023-11-16 DIAGNOSIS — R31.29 MICROSCOPIC HEMATURIA: ICD-10-CM

## 2023-11-16 DIAGNOSIS — N39.0 RECURRENT UTI: ICD-10-CM

## 2023-11-16 DIAGNOSIS — B35.1 ONYCHOMYCOSIS OF TOENAIL: ICD-10-CM

## 2023-11-16 DIAGNOSIS — Z12.31 ENCOUNTER FOR SCREENING MAMMOGRAM FOR MALIGNANT NEOPLASM OF BREAST: ICD-10-CM

## 2023-11-16 DIAGNOSIS — F17.290 VAPING NICOTINE DEPENDENCE, TOBACCO PRODUCT: ICD-10-CM

## 2023-11-16 DIAGNOSIS — I10 HYPERTENSION, ESSENTIAL: Primary | ICD-10-CM

## 2023-11-16 LAB
BILIRUBIN URINE: NEGATIVE
BLOOD URINE, POC: ABNORMAL
CHARACTER, URINE: ABNORMAL
COLOR, URINE: ABNORMAL
GLUCOSE URINE: NEGATIVE MG/DL
KETONES, URINE: NEGATIVE
LEUKOCYTE CLUMPS, URINE: ABNORMAL
NITRITE, URINE: POSITIVE
PH, URINE: 7 (ref 5–9)
PROTEIN, URINE: 30 MG/DL
SPECIFIC GRAVITY, URINE: 1.02 (ref 1–1.03)
UROBILINOGEN, URINE: 0.2 EU/DL (ref 0–1)

## 2023-11-16 PROCEDURE — 99214 OFFICE O/P EST MOD 30 MIN: CPT | Performed by: FAMILY MEDICINE

## 2023-11-16 PROCEDURE — 3077F SYST BP >= 140 MM HG: CPT | Performed by: FAMILY MEDICINE

## 2023-11-16 PROCEDURE — 1036F TOBACCO NON-USER: CPT | Performed by: FAMILY MEDICINE

## 2023-11-16 PROCEDURE — G8484 FLU IMMUNIZE NO ADMIN: HCPCS | Performed by: FAMILY MEDICINE

## 2023-11-16 PROCEDURE — G8427 DOCREV CUR MEDS BY ELIG CLIN: HCPCS | Performed by: FAMILY MEDICINE

## 2023-11-16 PROCEDURE — 3079F DIAST BP 80-89 MM HG: CPT | Performed by: FAMILY MEDICINE

## 2023-11-16 PROCEDURE — G8419 CALC BMI OUT NRM PARAM NOF/U: HCPCS | Performed by: FAMILY MEDICINE

## 2023-11-16 RX ORDER — AMLODIPINE BESYLATE 5 MG/1
5 TABLET ORAL DAILY
Qty: 90 TABLET | Refills: 3 | Status: SHIPPED | OUTPATIENT
Start: 2023-11-16

## 2023-11-16 RX ORDER — NICOTINE 21 MG/24HR
1 PATCH, TRANSDERMAL 24 HOURS TRANSDERMAL EVERY 24 HOURS
Qty: 42 PATCH | Refills: 0 | Status: SHIPPED | OUTPATIENT
Start: 2023-11-16 | End: 2023-12-28

## 2023-11-16 NOTE — PATIENT INSTRUCTIONS
LAB INSTRUCTIONS:    Please complete labs IN 2 week(s). Please fast for 8 hours prior to lab collection. The clinic will call you within 1 week of collection. If you have not heard from us within that amount of time, please call us at [phone].

## 2024-08-27 ENCOUNTER — HOSPITAL ENCOUNTER (EMERGENCY)
Age: 44
Discharge: HOME | End: 2024-08-27

## 2024-08-27 VITALS
BODY MASS INDEX: 24.8 KG/M2 | SYSTOLIC BLOOD PRESSURE: 147 MMHG | HEIGHT: 67 IN | DIASTOLIC BLOOD PRESSURE: 105 MMHG | RESPIRATION RATE: 16 BRPM | HEART RATE: 88 BPM | OXYGEN SATURATION: 100 % | WEIGHT: 158 LBS | TEMPERATURE: 97.7 F

## 2024-08-27 DIAGNOSIS — N30.01 ACUTE CYSTITIS WITH HEMATURIA: Primary | ICD-10-CM

## 2024-08-27 LAB
BILIRUB UR STRIP.AUTO-MCNC: NEGATIVE MG/DL
CHARACTER UR: CLEAR
COLOR, UA: YELLOW
GLUCOSE UR QL STRIP.AUTO: NEGATIVE MG/DL
KETONES UR QL STRIP.AUTO: NEGATIVE
NITRITE UR QL STRIP.AUTO: POSITIVE
PH UR STRIP.AUTO: 7 [PH] (ref 5–9)
PROT UR STRIP.AUTO-MCNC: NEGATIVE MG/DL
RBC #/AREA URNS HPF: ABNORMAL /[HPF]
SP GR UR STRIP.AUTO: 1.01 (ref 1–1.03)
UROBILINOGEN, URINE: 0.2 EU/DL (ref 0.2–1)
WBC #/AREA URNS HPF: ABNORMAL /[HPF]

## 2024-08-27 PROCEDURE — 81003 URINALYSIS AUTO W/O SCOPE: CPT

## 2024-08-27 PROCEDURE — 87077 CULTURE AEROBIC IDENTIFY: CPT

## 2024-08-27 PROCEDURE — 99213 OFFICE O/P EST LOW 20 MIN: CPT

## 2024-08-27 PROCEDURE — 87086 URINE CULTURE/COLONY COUNT: CPT

## 2024-08-27 PROCEDURE — 87186 SC STD MICRODIL/AGAR DIL: CPT

## 2024-08-27 PROCEDURE — 99214 OFFICE O/P EST MOD 30 MIN: CPT

## 2024-08-27 RX ORDER — PHENAZOPYRIDINE HYDROCHLORIDE 100 MG/1
100 TABLET, FILM COATED ORAL 3 TIMES DAILY PRN
Qty: 9 TABLET | Refills: 0 | Status: SHIPPED | OUTPATIENT
Start: 2024-08-27 | End: 2024-08-30

## 2024-08-27 RX ORDER — NITROFURANTOIN 25; 75 MG/1; MG/1
100 CAPSULE ORAL 2 TIMES DAILY
Qty: 10 CAPSULE | Refills: 0 | Status: SHIPPED | OUTPATIENT
Start: 2024-08-27 | End: 2024-09-01

## 2024-08-27 ASSESSMENT — PAIN - FUNCTIONAL ASSESSMENT: PAIN_FUNCTIONAL_ASSESSMENT: NONE - DENIES PAIN

## 2024-08-27 NOTE — ED PROVIDER NOTES
[facility] URGENT CARE  Urgent Care Encounter       CHIEF COMPLAINT       Chief Complaint   Patient presents with    Urinary Frequency       Nurses Notes reviewed and I agree except as noted in the HPI.  HISTORY OF PRESENT ILLNESS   Petrona Winkler is a 44 y.o. female who presents with concerns of urinary frequency, dysuria, urgency, and flank pain for the past three days. Patient reports no use of medication for symptom management.     HPI    REVIEW OF SYSTEMS     Review of Systems   Constitutional:  Negative for fatigue and fever.   Genitourinary:  Positive for dysuria, flank pain, frequency and urgency.   All other systems reviewed and are negative.      PAST MEDICAL HISTORY         Diagnosis Date    Atrophic kidney, acquired     Left     Essential hypertension 2016    GERD (gastroesophageal reflux disease)     History of blood transfusion     Insomnia 2014    Migraine headache     Nephrolithiasis     Nicotine dependence     Recurrent UTI        SURGICALHISTORY     Patient  has a past surgical history that includes  section (,,); Lithotripsy (off and on since ); Tubal ligation (); other surgical history (10/21/2016); and Kidney removal (Left).    CURRENT MEDICATIONS       Discharge Medication List as of 2024  6:31 PM        CONTINUE these medications which have NOT CHANGED    Details   amLODIPine (NORVASC) 5 MG tablet Take 1 tablet by mouth daily, Disp-90 tablet, R-3Normal      acetaminophen (TYLENOL) 325 MG tablet Take 2 tablets by mouth every 6 hours as needed for PainHistorical Med             ALLERGIES     Patient is has No Known Allergies.    Patients   Immunization History   Administered Date(s) Administered    Influenza Virus Vaccine 2018    TDaP, ADACEL (age 10y-64y), BOOSTRIX (age 10y+), IM, 0.5mL 2022    Td, (Adult) Not Adsorbed 2019       FAMILY HISTORY     Patient's family history includes Breast Cancer (age of onset: 78) in

## 2024-08-27 NOTE — DISCHARGE INSTRUCTIONS
Medication as prescribed.  Culture results in 24-48 hours.   Increase water intake, frequent hand washing.  Tylenol / Ibuprofen as needed for fever and or pain.  Follow up with PCP in 3-5 days if no improvement or sooner with worsening symptoms.

## 2024-08-27 NOTE — ED TRIAGE NOTES
Pt called stating that she has been admitted to [facility]. She will be transferred to [facility] for a higher level of care. Pt states that she may need surgery. Pt is asking about the status of the medication appeal. Pt was advised that we are still waiting on the appeal determination from [employer]. It was downgraded from Urgent Appeal to a standard appeal by the Appeal Dept. They will make a decision by April 10th, 2024.     Pt was advised that I can call the insurance company and provide an update to see if the appeal can be changed back to an urgent request since she has been hospitalized.     1218 - Called [employer], spoke to Maria Isabel, she will transfer the call to the eligibility and benefits team to check on the appeal status. She also provided the fax number to send additional clinical notes to show the pt is in the hospital. [phone] 1226 - spoke to Melody, she contacted the Appeal Dept. She was advised once an appeal has been downgraded to standard, it cannot be changed.     She advised to send the clinical notes regarding the ER and hospital admission to [phone] for review. She advised to send an additional clinical notes for the medication denial -[id number] on 3/12/2024. She instructed me NOT to reference the appeal since this has already been downgraded. The information will be reviewed, no guarantee if the decision can or will be changed. They still have until 4/10/2024 to make a decision. Call reference # is [id number].       Called ended [phone] - Faxed ED/H clinical notes, [facility] Intake for [facility] notes, [employer] Approval for infusions Auth# [id number], Medication denial letter - [id number].    Pt to [facility] ambulatory with frequency on urination.  This started on Saturday.

## 2024-08-30 LAB
BACTERIA UR CULT: ABNORMAL
ORGANISM: ABNORMAL

## 2024-11-18 DIAGNOSIS — I10 HYPERTENSION, ESSENTIAL: ICD-10-CM

## 2024-11-18 RX ORDER — AMLODIPINE BESYLATE 5 MG/1
5 TABLET ORAL DAILY
Qty: 90 TABLET | Refills: 3 | Status: SHIPPED | OUTPATIENT
Start: 2024-11-18

## 2024-11-18 NOTE — TELEPHONE ENCOUNTER
Recent Visits  Date Type Provider Dept   11/16/23 Office Visit Lazaro Schmitt, DO [facility]   Showing recent visits within past 540 days with a meds authorizing provider and meeting all other requirements  Future Appointments  No visits were found meeting these conditions.  Showing future appointments within next 150 days with a meds authorizing provider and meeting all other requirements

## 2025-02-03 ENCOUNTER — HOSPITAL ENCOUNTER (EMERGENCY)
Age: 45
Discharge: HOME | End: 2025-02-03
Payer: COMMERCIAL

## 2025-02-03 VITALS
HEART RATE: 92 BPM | BODY MASS INDEX: 24.33 KG/M2 | RESPIRATION RATE: 18 BRPM | OXYGEN SATURATION: 99 % | HEIGHT: 67 IN | DIASTOLIC BLOOD PRESSURE: 92 MMHG | WEIGHT: 155 LBS | SYSTOLIC BLOOD PRESSURE: 184 MMHG | TEMPERATURE: 98.2 F

## 2025-02-03 DIAGNOSIS — J06.9 VIRAL URI WITH COUGH: Primary | ICD-10-CM

## 2025-02-03 LAB
FLUAV AG SPEC QL: NEGATIVE
FLUBV AG SPEC QL: NEGATIVE

## 2025-02-03 PROCEDURE — 99213 OFFICE O/P EST LOW 20 MIN: CPT

## 2025-02-03 PROCEDURE — 87804 INFLUENZA ASSAY W/OPTIC: CPT

## 2025-02-03 RX ORDER — ACETAMINOPHEN AND CHLORPHENIRAMINE MALEATE 325; 2 MG/1; MG/1
2 TABLET, FILM COATED ORAL EVERY 4 HOURS PRN
Qty: 80 TABLET | Refills: 0 | Status: SHIPPED | OUTPATIENT
Start: 2025-02-03 | End: 2025-02-10

## 2025-02-03 RX ORDER — PREDNISONE 20 MG/1
20 TABLET ORAL 2 TIMES DAILY
Qty: 10 TABLET | Refills: 0 | Status: SHIPPED | OUTPATIENT
Start: 2025-02-03 | End: 2025-02-08

## 2025-02-03 ASSESSMENT — LIFESTYLE VARIABLES
HOW MANY STANDARD DRINKS CONTAINING ALCOHOL DO YOU HAVE ON A TYPICAL DAY: PATIENT DOES NOT DRINK
HOW OFTEN DO YOU HAVE A DRINK CONTAINING ALCOHOL: NEVER

## 2025-02-03 ASSESSMENT — ENCOUNTER SYMPTOMS
COUGH: 1
ABDOMINAL PAIN: 0

## 2025-02-03 ASSESSMENT — PAIN - FUNCTIONAL ASSESSMENT: PAIN_FUNCTIONAL_ASSESSMENT: NONE - DENIES PAIN

## 2025-02-03 NOTE — ED TRIAGE NOTES
Pt to [facility] ambulatory with a cough, runny nose, and bilateral ear pain.  This started on Thursday.

## 2025-02-03 NOTE — ED PROVIDER NOTES
in her maternal grandmother; Diabetes in her maternal grandmother; High Blood Pressure in her mother.    SOCIAL HISTORY     Patient  reports that she has quit smoking. Her smoking use included cigarettes. She has a 5 pack-year smoking history. She has never used smokeless tobacco. She reports that she does not currently use alcohol. She reports that she does not use drugs.    PHYSICAL EXAM     ED TRIAGE VITALS  BP: (!) 184/92, Temp: 98.2 °F (36.8 °C), Pulse: 92, Respirations: 18, SpO2: 99 %  Physical Exam  Vitals and nursing note reviewed.   Constitutional:       General: She is not in acute distress.     Appearance: Normal appearance. She is normal weight. She is not ill-appearing or diaphoretic.   HENT:      Head: Normocephalic and atraumatic.      Right Ear: A middle ear effusion is present.      Left Ear: A middle ear effusion is present.      Nose: No congestion.      Mouth/Throat:      Mouth: Mucous membranes are moist.   Cardiovascular:      Rate and Rhythm: Normal rate.   Pulmonary:      Effort: Pulmonary effort is normal. No respiratory distress.      Breath sounds: Normal breath sounds. No stridor. No wheezing or rhonchi.   Skin:     General: Skin is warm and dry.      Capillary Refill: Capillary refill takes less than 2 seconds.   Neurological:      General: No focal deficit present.      Mental Status: She is alert and oriented to person, place, and time.         DIAGNOSTIC RESULTS   Labs:  Results for orders placed or performed during the hospital encounter of 02/03/25   Rapid influenza A/B antigens    Specimen: Nasopharyngeal   Result Value Ref Range    Flu A Antigen Negative NEGATIVE    Influenza B Ag, EIA Negative NEGATIVE       IMAGING:  No orders to display      URGENT CARE COURSE:     Vitals:    02/03/25 1422   BP: (!) 184/92   Pulse: 92   Resp: 18   Temp: 98.2 °F (36.8 °C)   TempSrc: Oral   SpO2: 99%   Weight: 70.3 kg (155 lb)   Height: 1.702 m (5' 7\")       Medications - No data to

## 2025-04-29 ENCOUNTER — TELEPHONE (OUTPATIENT)
Dept: FAMILY MEDICINE CLINIC | Age: 45
End: 2025-04-29

## 2025-04-29 DIAGNOSIS — N39.0 ACUTE URINARY TRACT INFECTION: Primary | ICD-10-CM

## 2025-04-29 RX ORDER — CEPHALEXIN 500 MG/1
500 CAPSULE ORAL 2 TIMES DAILY
Qty: 10 CAPSULE | Refills: 0 | Status: SHIPPED | OUTPATIENT
Start: 2025-04-29 | End: 2025-05-04

## 2025-04-29 NOTE — TELEPHONE ENCOUNTER
F/u if no better     Diagnosis Orders   1. Acute urinary tract infection  cephALEXin (KEFLEX) 500 MG capsule

## 2025-04-29 NOTE — TELEPHONE ENCOUNTER
Pt started with symptoms of an UTI yesteday. She is having frequency, urgency and burning with urinating. No pain and no blood in the urine.  She is drinking just water.       She is asking if you can something in to [facility] on [address].       Pt stated ok to just leave a message if medication is sent in on her VM. She is at work.

## 2025-04-29 NOTE — TELEPHONE ENCOUNTER
Left detailed message about rx being sent in. Okay per HIPAA. Requested call back at [phone]  if they have any further questions.

## 2025-09-02 ENCOUNTER — OFFICE VISIT (OUTPATIENT)
Dept: FAMILY MEDICINE CLINIC | Age: 45
End: 2025-09-02
Payer: COMMERCIAL

## 2025-09-02 ENCOUNTER — TELEPHONE (OUTPATIENT)
Dept: FAMILY MEDICINE CLINIC | Age: 45
End: 2025-09-02

## 2025-09-02 VITALS
BODY MASS INDEX: 25.93 KG/M2 | DIASTOLIC BLOOD PRESSURE: 80 MMHG | RESPIRATION RATE: 16 BRPM | TEMPERATURE: 98.1 F | SYSTOLIC BLOOD PRESSURE: 130 MMHG | WEIGHT: 165.2 LBS | HEIGHT: 67 IN | OXYGEN SATURATION: 99 % | HEART RATE: 74 BPM

## 2025-09-02 DIAGNOSIS — I10 HYPERTENSION, ESSENTIAL: ICD-10-CM

## 2025-09-02 DIAGNOSIS — Z12.31 ENCOUNTER FOR SCREENING MAMMOGRAM FOR MALIGNANT NEOPLASM OF BREAST: ICD-10-CM

## 2025-09-02 DIAGNOSIS — R31.29 MICROSCOPIC HEMATURIA: ICD-10-CM

## 2025-09-02 DIAGNOSIS — Z12.11 SCREENING FOR COLON CANCER: ICD-10-CM

## 2025-09-02 DIAGNOSIS — N39.0 RECURRENT UTI: Chronic | ICD-10-CM

## 2025-09-02 DIAGNOSIS — B35.1 ONYCHOMYCOSIS OF TOENAIL: Primary | ICD-10-CM

## 2025-09-02 DIAGNOSIS — F17.290 VAPING NICOTINE DEPENDENCE, TOBACCO PRODUCT: ICD-10-CM

## 2025-09-02 LAB
BACTERIA: ABNORMAL
BILIRUB UR QL STRIP: NEGATIVE
CASTS #/AREA URNS LPF: ABNORMAL /LPF
CHARACTER UR: CLEAR
COLOR UR: YELLOW
CRYSTALS URNS QL MICRO: ABNORMAL
CRYSTALS URNS QL MICRO: ABNORMAL
EPITHELIAL CELLS, UA: ABNORMAL /HPF
GLUCOSE UR QL STRIP.AUTO: NEGATIVE MG/DL
HGB UR QL STRIP.AUTO: NEGATIVE
KETONES UR QL STRIP.AUTO: NEGATIVE
LEUKOCYTE ESTERASE UR QL STRIP.AUTO: NEGATIVE
NITRITE UR QL STRIP.AUTO: POSITIVE
PH UR STRIP.AUTO: 8 [PH] (ref 5–9)
PROT UR STRIP.AUTO-MCNC: NEGATIVE MG/DL
RBC #/AREA URNS HPF: ABNORMAL /HPF
SPECIFIC GRAVITY UA: 1.01 (ref 1–1.03)
UROBILINOGEN, URINE: 0.2 EU/DL (ref 0–1)
WBC #/AREA URNS HPF: ABNORMAL /HPF
YEAST LIKE FUNGI URNS QL MICRO: ABNORMAL

## 2025-09-02 PROCEDURE — 3079F DIAST BP 80-89 MM HG: CPT | Performed by: FAMILY MEDICINE

## 2025-09-02 PROCEDURE — 1036F TOBACCO NON-USER: CPT | Performed by: FAMILY MEDICINE

## 2025-09-02 PROCEDURE — 3075F SYST BP GE 130 - 139MM HG: CPT | Performed by: FAMILY MEDICINE

## 2025-09-02 PROCEDURE — 99214 OFFICE O/P EST MOD 30 MIN: CPT | Performed by: FAMILY MEDICINE

## 2025-09-02 PROCEDURE — G8427 DOCREV CUR MEDS BY ELIG CLIN: HCPCS | Performed by: FAMILY MEDICINE

## 2025-09-02 PROCEDURE — G8419 CALC BMI OUT NRM PARAM NOF/U: HCPCS | Performed by: FAMILY MEDICINE

## 2025-09-02 RX ORDER — NICOTINE 21 MG/24HR
1 PATCH, TRANSDERMAL 24 HOURS TRANSDERMAL EVERY 24 HOURS
Qty: 14 PATCH | Refills: 0 | Status: SHIPPED | OUTPATIENT
Start: 2025-10-14 | End: 2025-10-28

## 2025-09-02 RX ORDER — AMLODIPINE BESYLATE 5 MG/1
2.5 TABLET ORAL DAILY
Qty: 90 TABLET | Refills: 3 | Status: SHIPPED | OUTPATIENT
Start: 2025-09-02

## 2025-09-02 RX ORDER — NICOTINE 21 MG/24HR
1 PATCH, TRANSDERMAL 24 HOURS TRANSDERMAL EVERY 24 HOURS
Qty: 42 PATCH | Refills: 0 | Status: SHIPPED | OUTPATIENT
Start: 2025-09-02 | End: 2025-10-14

## 2025-09-02 SDOH — ECONOMIC STABILITY: FOOD INSECURITY: WITHIN THE PAST 12 MONTHS, THE FOOD YOU BOUGHT JUST DIDN'T LAST AND YOU DIDN'T HAVE MONEY TO GET MORE.: NEVER TRUE

## 2025-09-02 SDOH — ECONOMIC STABILITY: FOOD INSECURITY: WITHIN THE PAST 12 MONTHS, YOU WORRIED THAT YOUR FOOD WOULD RUN OUT BEFORE YOU GOT MONEY TO BUY MORE.: NEVER TRUE

## 2025-09-02 ASSESSMENT — PATIENT HEALTH QUESTIONNAIRE - PHQ9
SUM OF ALL RESPONSES TO PHQ QUESTIONS 1-9: 0
1. LITTLE INTEREST OR PLEASURE IN DOING THINGS: NOT AT ALL
2. FEELING DOWN, DEPRESSED OR HOPELESS: NOT AT ALL
SUM OF ALL RESPONSES TO PHQ QUESTIONS 1-9: 0

## 2025-09-04 ENCOUNTER — TELEPHONE (OUTPATIENT)
Dept: FAMILY MEDICINE CLINIC | Age: 45
End: 2025-09-04

## 2025-09-04 LAB
BACTERIA UR CULT: ABNORMAL
ORGANISM: ABNORMAL

## 2025-09-07 LAB
ALBUMIN: 4.3 G/DL (ref 3.5–5.2)
ALK PHOSPHATASE: 57 U/L (ref 30–101)
ALT SERPL-CCNC: 8 U/L (ref 5–33)
ANION GAP SERPL CALCULATED.3IONS-SCNC: 11 MMOL/L (ref 7–16)
AST SERPL-CCNC: 12 U/L (ref 9–40)
BASOPHILS ABSOLUTE: 0.02 K/UL (ref 0–0.2)
BASOPHILS RELATIVE PERCENT: 0.3 % (ref 0–2)
BILIRUB SERPL-MCNC: 1 MG/DL
BUN BLDV-MCNC: 15 MG/DL (ref 6–20)
CALCIUM SERPL-MCNC: 8.7 MG/DL (ref 8.6–10.5)
CHLORIDE BLD-SCNC: 106 MMOL/L (ref 96–107)
CHOLESTEROL, TOTAL: 129 MG/DL (ref 100–199)
CHOLESTEROL/HDL RATIO: 2.9 (ref 2–4.5)
CO2: 22 MMOL/L (ref 18–32)
CREAT SERPL-MCNC: 0.85 MG/DL (ref 0.51–1.15)
CREATINE, URINE: 68.8 MG/DL
EGFR IF NONAFRICAN AMERICAN: 86 ML/MIN/1.73M2
EOSINOPHILS ABSOLUTE: 0.09 K/UL (ref 0–0.8)
EOSINOPHILS RELATIVE PERCENT: 1.3 % (ref 0–5)
GLUCOSE: 84 MG/DL (ref 70–100)
HCT VFR BLD CALC: 42.3 % (ref 35–47)
HDLC SERPL-MCNC: 44 MG/DL
HEMOGLOBIN: 13.7 G/DL (ref 11.9–16)
IMMATURE GRANS (ABS): 0.01 K/UL (ref 0–0.06)
IMMATURE GRANULOCYTES %: 0.1 % (ref 0–2)
LDL CHOLESTEROL: 74 MG/DL
LDL/HDL RATIO: 1.7
LYMPHOCYTES ABSOLUTE: 1.98 K/UL (ref 0.9–5.2)
LYMPHOCYTES RELATIVE PERCENT: 27.8 % (ref 20–45)
MCH RBC QN AUTO: 32.2 PG (ref 26–33)
MCHC RBC AUTO-ENTMCNC: 32.4 G/DL (ref 31–36)
MCV RBC AUTO: 99 FL (ref 75–100)
MICROALBUMIN/CREAT 24H UR: 9.2 MG/L
MICROALBUMIN/CREAT UR-RTO: 13 MG/G
MONOCYTES ABSOLUTE: 0.37 K/UL (ref 0.1–1)
MONOCYTES RELATIVE PERCENT: 5.2 % (ref 0–13)
NEUTROPHILS ABSOLUTE: 4.64 K/UL (ref 1.9–8)
NEUTROPHILS RELATIVE PERCENT: 65.3 % (ref 45–75)
PDW BLD-RTO: 11.6 % (ref 11.2–14.8)
PLATELET # BLD: 234 THOUS/CMM (ref 140–440)
POTASSIUM SERPL-SCNC: 4 MMOL/L (ref 3.5–5.4)
RBC # BLD: 4.26 MILL/CMM (ref 3.8–5.2)
SODIUM BLD-SCNC: 139 MMOL/L (ref 135–148)
TOTAL PROTEIN: 6.6 G/DL (ref 6–8.3)
TRIGL SERPL-MCNC: 53 MG/DL (ref 20–149)
TSH SERPL DL<=0.05 MIU/L-ACNC: 0.56 UIU/ML (ref 0.27–4.2)
VLDLC SERPL CALC-MCNC: 11 MG/DL
WBC # BLD: 7.1 THDS/CMM (ref 3.6–11)